# Patient Record
Sex: FEMALE | Employment: UNEMPLOYED | URBAN - METROPOLITAN AREA
[De-identification: names, ages, dates, MRNs, and addresses within clinical notes are randomized per-mention and may not be internally consistent; named-entity substitution may affect disease eponyms.]

---

## 2023-09-07 ENCOUNTER — OFFICE VISIT (OUTPATIENT)
Dept: FAMILY MEDICINE | Facility: CLINIC | Age: 28
End: 2023-09-07
Payer: COMMERCIAL

## 2023-09-07 VITALS
OXYGEN SATURATION: 98 % | HEART RATE: 93 BPM | HEIGHT: 57 IN | BODY MASS INDEX: 21.46 KG/M2 | TEMPERATURE: 97.8 F | WEIGHT: 99.5 LBS | DIASTOLIC BLOOD PRESSURE: 52 MMHG | RESPIRATION RATE: 20 BRPM | SYSTOLIC BLOOD PRESSURE: 90 MMHG

## 2023-09-07 DIAGNOSIS — R06.2 WHEEZING: Primary | ICD-10-CM

## 2023-09-07 DIAGNOSIS — R06.00 DYSPNEA, UNSPECIFIED TYPE: ICD-10-CM

## 2023-09-07 PROCEDURE — 99203 OFFICE O/P NEW LOW 30 MIN: CPT | Performed by: FAMILY MEDICINE

## 2023-09-07 RX ORDER — ALBUTEROL SULFATE 90 UG/1
2 AEROSOL, METERED RESPIRATORY (INHALATION) EVERY 6 HOURS PRN
COMMUNITY
End: 2023-09-07

## 2023-09-07 RX ORDER — ALBUTEROL SULFATE 90 UG/1
2 AEROSOL, METERED RESPIRATORY (INHALATION) EVERY 6 HOURS PRN
Qty: 18 G | Status: CANCELLED | OUTPATIENT
Start: 2023-09-07

## 2023-09-07 ASSESSMENT — PATIENT HEALTH QUESTIONNAIRE - PHQ9
SUM OF ALL RESPONSES TO PHQ QUESTIONS 1-9: 13
10. IF YOU CHECKED OFF ANY PROBLEMS, HOW DIFFICULT HAVE THESE PROBLEMS MADE IT FOR YOU TO DO YOUR WORK, TAKE CARE OF THINGS AT HOME, OR GET ALONG WITH OTHER PEOPLE: VERY DIFFICULT
SUM OF ALL RESPONSES TO PHQ QUESTIONS 1-9: 13

## 2023-09-07 NOTE — PROGRESS NOTES
"  Wheezing  She is using albuterol twice a day, scheduled.  Advised to use only as needed.  We will add Dulera.  Unclear if she has history of asthma.  We will try to get records from [place].    Dyspnea, unspecified type  - mometasone-formoterol (DULERA) 100-5 MCG/ACT inhaler; Inhale 2 puffs into the lungs 2 times daily     Subjective   Pu is a 28 year old female here with a complaint of \" trouble breathing\".  She is new to this clinic.  She moved to [place] from [place] in mid June this year, about 2 months ago.  She was seen at [facility] due to shortness of breath and was prescribed albuterol inhaler.  She has been using albuterol twice a day, scheduled.  Reports numbness of breath once or twice a week with wheezing.  Unclear if she has history of asthma.  States she was never hospitalized due to shortness of breath in the past.        9/7/2023     3:44 PM   Additional Questions   Roomed by chela contreras   Accompanied by Self       Review of Systems   CONSTITUTIONAL: NEGATIVE for fever, chills, change in weight  CV: NEGATIVE for chest pain/chest pressure      Objective    BP 90/52 (BP Location: Left arm, Patient Position: Sitting, Cuff Size: Adult Regular)   Pulse 93   Temp 97.8  F (36.6  C) (Oral)   Resp 20   Ht 1.44 m (4' 8.69\")   Wt 45.1 kg (99 lb 8 oz)   LMP 09/03/2023 (Approximate)   SpO2 98%   BMI 21.77 kg/m    Body mass index is 21.77 kg/m .  Physical Exam   GENERAL: healthy, alert and no distress  NECK: Supple  RESP: lungs clear to auscultation - no rales, rhonchi or wheezes  CV: regular rates and rhythm and no murmur, click or rub  PSYCH: mentation appears normal, affect normal/bright                    "

## 2023-09-26 ENCOUNTER — TELEPHONE (OUTPATIENT)
Dept: FAMILY MEDICINE | Facility: CLINIC | Age: 28
End: 2023-09-26
Payer: COMMERCIAL

## 2023-09-26 NOTE — TELEPHONE ENCOUNTER
Forms/Letter Request    Type of form/letter:  [facility]    Have you been seen for this request: Yes 09/07/2023    Do we have the form/letter: Yes:     Who is the form from? Patient    Where did/will the form come from? Patient or family brought in       When is form/letter needed by: asap    How would you like the form/letter returned:     Patient Notified form requests are processed in 3-5 business days:Yes    Okay to leave a detailed message?: Yes at Cell number on file:    Telephone Information:   Mobile [phone]

## 2023-09-28 NOTE — TELEPHONE ENCOUNTER
Called patient, scheduled an office visit with provider on 10/13/23 to come in and have her form filled out.       Sol Dee, MSN, RN   [facility] [place] [facility]

## 2023-09-28 NOTE — TELEPHONE ENCOUNTER
I saw the patient only once. Unclear if she has chronic medical conditions to prevent her from working.   She should come in in person to discuss this.   I did not sign the form.     Dr. Jose Lewis  9/28/2023 9:58 AM

## 2023-10-13 ENCOUNTER — OFFICE VISIT (OUTPATIENT)
Dept: FAMILY MEDICINE | Facility: CLINIC | Age: 28
End: 2023-10-13
Payer: COMMERCIAL

## 2023-10-13 VITALS
RESPIRATION RATE: 20 BRPM | OXYGEN SATURATION: 100 % | WEIGHT: 103.4 LBS | SYSTOLIC BLOOD PRESSURE: 93 MMHG | DIASTOLIC BLOOD PRESSURE: 60 MMHG | HEIGHT: 57 IN | BODY MASS INDEX: 22.31 KG/M2 | TEMPERATURE: 98.4 F | HEART RATE: 73 BPM

## 2023-10-13 DIAGNOSIS — R06.00 DYSPNEA, UNSPECIFIED TYPE: ICD-10-CM

## 2023-10-13 DIAGNOSIS — Z59.9 FINANCIAL DIFFICULTIES: ICD-10-CM

## 2023-10-13 DIAGNOSIS — R06.2 WHEEZING: Primary | ICD-10-CM

## 2023-10-13 PROCEDURE — 99213 OFFICE O/P EST LOW 20 MIN: CPT | Performed by: FAMILY MEDICINE

## 2023-10-13 SDOH — ECONOMIC STABILITY - INCOME SECURITY: PROBLEM RELATED TO HOUSING AND ECONOMIC CIRCUMSTANCES, UNSPECIFIED: Z59.9

## 2023-10-13 NOTE — PROGRESS NOTES
"  Wheezing  Improved.    Dyspnea, unspecified type  Improved.    Financial difficulties  - Primary Care - Care Coordination Referral; Future     Subjective   Pu is a 28 year old, presenting for the following health issues:  Forms      10/13/2023     7:08 AM   Additional Questions   Roomed by Monica   The patient is here requesting form to be filled out by provider for [facility] stating she is unable to work.  She moved to [place] from [place] 4 months ago.  She was seen here once for wheezing last month.  She was given inhalers.  She states her wheezing has improved.  Shortness of breath has improved.  She is not using her inhalers now.  No other known chronic medical conditions.        Review of Systems   CONSTITUTIONAL: NEGATIVE for fever, chills, change in weight  RESP: NEGATIVE for significant cough or SOB  CV: NEGATIVE for chest pain/chest pressure      Objective    LMP  (LMP Unknown)   Vitals:    10/13/23 0712   BP: 93/60   Pulse: 73   Resp: 20   Temp: 98.4  F (36.9  C)   TempSrc: Oral   SpO2: 100%   Weight: 46.9 kg (103 lb 6.4 oz)   Height: 1.448 m (4' 9\")      Physical Exam   GENERAL: healthy, alert and no distress  NECK: Supple  RESP: lungs clear to auscultation - no rales, rhonchi or wheezes  CV: regular rates and rhythm and no murmur, click or rub  PSYCH: mentation appears normal, affect normal/bright      This transcription uses voice recognition software, which may contain typographical errors.                "

## 2023-10-16 ENCOUNTER — PATIENT OUTREACH (OUTPATIENT)
Dept: CARE COORDINATION | Facility: CLINIC | Age: 28
End: 2023-10-16
Payer: COMMERCIAL

## 2023-10-16 NOTE — PROGRESS NOTES
"Clinic Care Coordination Contact  Community Health Worker Initial Outreach  Spoke with patient through [place]  ID [id number]    CHW Initial Information Gathering:  Referral Source: PCP  Current living arrangement:: I live in a private home with family  Type of residence:: Apartment  Community Resources: Financial/Insurance, County Programs (SNAP)  Supplies Currently Used at Home: None  Equipment Currently Used at Home: none  Informal Support system:: Spouse  No PCP office visit in Past Year: No  Transportation means:: Accessible car, Medical transport  CHW Additional Questions  If ED/Hospital discharge, follow-up appointment scheduled as recommended?: N/A  Medication changes made following ED/Hospital discharge?: N/A  MyChart active?: No  Patient agreeable to assistance with activating MyChart?: No    Chart Review: Referral from PCP  Reason for Referral: Financial Support, Food, housing   Additional Information: Trying to get food stamps    Patient accepts CC: Yes. Patient scheduled for assessment with KURT Hall on 10/19/23 at 2PM. Patient noted desire to discuss CCC, resources for financial support, food, and housing. .     Patient shares, that household is receiving \"food stamp\" currently. However, they are required to work 55 hours. Her  is only working 24 hours. Patient could not state if requirement is per nate Martinez  Clinic Care Coordination  [facility]    Phone: [phone]        "

## 2023-10-19 ENCOUNTER — PATIENT OUTREACH (OUTPATIENT)
Dept: FAMILY MEDICINE | Facility: CLINIC | Age: 28
End: 2023-10-19
Payer: COMMERCIAL

## 2023-10-19 ASSESSMENT — ACTIVITIES OF DAILY LIVING (ADL): DEPENDENT_IADLS:: INDEPENDENT

## 2023-10-19 NOTE — LETTER
Primary care doctor    [facility]  Primary Clinic  Accepting new patients  [address] [place] [address]  1.63 miles [phone]    [facility]  Specialty Clinic  Accepting new patients  [address] [place] [address]  1.98 miles [phone]    [facility]  Specialty Clinic  Accepting new patients  [address]  [place] [address]  2.11 miles [phone]    [facility]  Specialty Clinic  Accepting new patients  [address]  [address] [address]/20/23, 11:19 AM Print about:blank 2/3   [place] [address]  2.11 miles [phone]    [facility]  Specialty Clinic  Accepting new patients  [address]  [place] [address]  2.11 miles [phone]      Employment center       [facility]  Job Search Assistance    [facility]    [address] [place], [place], [address]    Distance: 2 Miles    [phone]      [facility] - Employment Services  Job Search Assistance    [facility]    [address] [place], [place], [address]    Distance: 1 Miles    [phone]    [facility] workforce center     [employer]  [phone] (Fax)  Contact form  Department information    In-person locations -  Walk-in services available    Address In-person services  [place]   [address] [facility]    Mondays and Wednesdays  10 a.m.-4 p.m.  [facility] [place]    [address]  [address] Tuesdays & Thursdays  10 a.m.-4:30 p.m.  [facility] [address] Thursdays  10 a.m.-12:30 p.m.  [place] [facility] [address] Tuesdays  1-3 p.m.  [employer] in [place] [address]   Monday - Thursday  8 a.m.-4:30 p.m.

## 2023-10-19 NOTE — LETTER
[facility] CARE COORDINATION  [address]  [place] [address]    October 20, 2023    Cedric [facility]  [address]   [place] [address]      Dear Cedric,        I am a  clinic care coordinator who works with Jose Lewis MD with the [facility]. I wanted to thank you for spending the time to talk with me.  Below is a description of clinic care coordination and how I can further assist you.       The clinic care coordination team is made up of a registered nurse, , financial resource worker and community health worker who understand the health care system. The goal of clinic care coordination is to help you manage your health and improve access to the health care system. Our team works alongside your provider to assist you in determining your health and social needs. We can help you obtain health care and community resources, providing you with necessary information and education. We can work with you through any barriers and develop a care plan that helps coordinate and strengthen the communication between you and your care team.  Our services are voluntary and are offered without charge to you personally.    Please feel free to contact me with any questions or concerns regarding care coordination and what we can offer.      We are focused on providing you with the highest-quality healthcare experience possible.    Sincerely,     Ayesha Raya, [facility]  Social Work Care Cooridinator   [facility]   Phone: [phone]

## 2023-10-19 NOTE — LETTER
[facility]  Patient Centered Plan of Care  About Me:        Patient Name:  Cedric Baldwin    YOB: 1995  Age:         28 year old   Yamil MRN:    [id number] Telephone Information:  Home Phone [phone]   Mobile [phone]       Address:  [address]  [place] [address] Email address:  [email or web address]      Emergency Contact(s)    Name Relationship Lgl Grd Work Phone Home Phone Mobile Phone   1Quinn AVALOS BERENICE Brother    [phone]           Primary language:  Sabine      needed? Yes   [place] [employer]:  [phone] op. 1  Other communication barriers:Language barrier    Preferred Method of Communication:  Phone   Current living arrangement: I live in a private home with family    Mobility Status/ Medical Equipment: Independent        Health Maintenance  Health Maintenance Reviewed: Up to date      My Access Plan  Medical Emergency 911   Primary Clinic Line [facility] [phone]   24 Hour Appointment Line [phone] or  [phone]) (toll-free)   24 Hour Nurse Line [phone] (toll-free)   Preferred Urgent Care [facility], [phone]     [facility]  [phone]     Preferred Pharmacy [facility] - [place], [place] - [address]     Behavioral Health Crisis Line The National Suicide Prevention Lifeline at 1-592.120.3377 or Text/Call 558             My Care Team Members  Patient Care Team         Relationship Specialty Notifications Start End    Jose Lewis MD PCP - General Family Medicine  10/19/23     Phone: [phone] Fax: [phone]         [address] [place] [address]    Jose Lewis MD Assigned PCP   8/9/23     Phone: [phone] Fax: [phone]         [address] [place] [address]    Rhiannon Martinez Community Health Worker Primary Care - CC Admissions 10/13/23     Machelle Raya MSW Lead Care  Coordinator  Admissions 10/19/23               My Care Plans  Self Management and Treatment Plan  Care Plan  Care Plan: Financial Wellbeing       Problem: Patient expresses financial resource strain       Goal: Create an action plan to increase financial stability       Start Date: 10/19/2023    This Visit's Progress: 10%    Priority: High    Note:     Barriers: Income, employment   Strengths: Spouse motivated to get employment  Patient expressed understanding of goal: Yes  Action steps to achieve this goal:  1. I will assist my spouse in engaging with workforce center in the next month.   2. I will engage with FRW to assist energy and emergency assistance.   3. I will engage with CC monthly and request additional support as needed.                               Care Plan: Sleep       Problem: Impaired Sleep Pattern       Goal: Demonstrate improved sleep habits       Start Date: 10/19/2023 Expected End Date: 1/20/2024    This Visit's Progress: 10%    Priority: Medium    Note:     Barriers: Knowledge   Strengths: Willing to seek assistance   Patient expressed understanding of goal: yes  Action steps to achieve this goal:  1. I will schedule an appointment with PCP and discuss medications assistance in next month.   2. I will engage with CC monthly and requested additional support as needed.                                 Care Plan: Mental Health       Problem: Mental Health Symptoms Need Improvement       Goal: Improve management of mental health symptoms and establish with mental health/psychosocial supports       Start Date: 10/19/2023 Expected End Date: 1/20/2024    This Visit's Progress: 10%    Note:     Barriers: knowledge   Strengths: Willing to seek assistance  Patient expressed understanding of goal: Yes  Action steps to achieve this goal:  1. I will schedule an appointment with pcp to discuss medications management for anxiety.  2. I will engage with CC monthly and request support as needed.                                Care Plan: Establish PCP       Problem: No Established Primary Care Provider       Goal: Establish with Primary Care Provider and complete an initial visit       Start Date: 10/19/2023    This Visit's Progress: 10%    Priority: High    Note:     Barriers: Knowledge of pcp clinic close to my address.   Strengths: Motivated to establish care  Patient expressed understanding of goal: Yes  Action steps to achieve this goal:  1. I will review list from mail by [facility] and establish care with a provider from the list in next month.   2. I will engage with CC monthly and requested additional support as needed.                                    Action Plans on File:                       Advance Care Plans/Directives Type:   No data recorded    My Medical and Care Information  Problem List   There is no problem list on file for this patient.     Current Medications and Allergies:  See printed Medication Report.    Care Coordination Start Date: 10/13/2023   Frequency of Care Coordination: monthly     Form Last Updated: 10/20/2023

## 2023-10-20 NOTE — PROGRESS NOTES
Clinic Care Coordination Contact  Clinic Care Coordination Contact  OUTREACH    Referral Information:  Referral Source: PCP    Primary Diagnosis: Psychosocial    Pu is 28 year old woman who resides in apartment with her  and two children. Pu shared [facility] as informed that she will close her SNAP unless her provider fills out paperwork of medical waiver for employment. Pu shared [facility] is request for her and her  to work 55 hours per week in order to continue to receive SNAP and cash assistance. Pu noted her  is actively looking for work and has had few interviews however he has been offered a job yet. Writer informed if they have notified of county reason unemployed and encouraged for Pu and her spouse to connect with workforce center.      Pu noted she doesn't want to work and [facility] is requesting a medical waiver. Writer inquired reason why Pu unable to work. Pu noted she used to work past however at time she doesn't want to work. She wants her  to work now. Writer provided education around medical waiver needing specific medical diagnoses. Writer informed Pu Dr Lewis will not be able to sign a medical waiver since there is no specific diagnoses preventing her from working. Pu noted she understands and the only reason she brought paperwork is at [facility]'s request. Writer reinforced medical waivers needing specific medical diagnoses and that she since she doesn't have one, Dr Lewis will not be able to sign the waiver. Writer encouraged for Pu to let [facility] worker know that there is no specific medical diagnoses preventing from working and see what else [facility] request from them.     Pu shared anxiety and sleep disturbance. She reports symptoms of SOB that appear to related to anxiety. Writer discuss tips and strategies of managing anxiety attacks. Writer offered therapy however Pu is not interested talk therapy. Pu noted she only interested in medications management at this time. Writer  encouraged for Pu to discuss medication support with pcp during her next visit. Pu noted [facility] clinic too far for her so she would like to establish care with pcp that is closer. Writer provided [facility] clinic closer to her address and also send her list.     Pu shared struggles with paying energy bill and since they both unemployed at the moment, she not sure they will able to rent next month. Writer send referral for FRW to assist with energy assistance and emergency assistance.     No chief complaint on file.       Universal Utilization: appropriate   Clinic Utilization  Difficulty keeping appointments:: No  Compliance Concerns: No  No-Show Concerns: No  No PCP office visit in Past Year: No  Utilization      No Show Count (past year)  0             ED Visits  0             Hospital Admissions  0                    Current as of: 10/20/2023 12:50 AM                Clinical Concerns:  Current Medical Concerns:    Current Outpatient Medications   Medication    mometasone-formoterol (DULERA) 100-5 MCG/ACT inhaler     No current facility-administered medications for this visit.         Current Behavioral Concerns: Pu reported anxiety however declined therapy at this time. She is open to medications management.     Education Provided to patient: Provided education around medical waiver and [facility] resources.    Pain  Pain (GOAL):: No  Health Maintenance Reviewed: Up to date  Clinical Pathway: None    Medication Management:  Medication review status: Medications reviewed and no changes reported per patient.             Functional Status:  Dependent ADLs:: Independent  Dependent IADLs:: Independent  Mobility Status: Independent  Fallen 2 or more times in the past year?: No  Any fall with injury in the past year?: No    Living Situation:  Current living arrangement:: I live in a private home with family  Type of residence:: Apartment    Lifestyle & Psychosocial Needs:    Social Determinants of Health     Food  Insecurity: High Risk (10/13/2023)    Food Insecurity     Within the past 12 months, did you worry that your food would run out before you got money to buy more?: Yes     Within the past 12 months, did the food you bought just not last and you didn t have money to get more?: Yes   Depression: At risk (9/7/2023)    PHQ-2     PHQ-2 Score: 3   Housing Stability: High Risk (10/13/2023)    Housing Stability     Do you have housing? : Yes     Are you worried about losing your housing?: Yes   Tobacco Use: Low Risk  (10/13/2023)    Patient History     Smoking Tobacco Use: Never     Smokeless Tobacco Use: Never     Passive Exposure: Never   Financial Resource Strain: High Risk (10/13/2023)    Financial Resource Strain     Within the past 12 months, have you or your family members you live with been unable to get utilities (heat, electricity) when it was really needed?: Yes   Alcohol Use: Not on file   Transportation Needs: High Risk (10/13/2023)    Transportation Needs     Within the past 12 months, has lack of transportation kept you from medical appointments, getting your medicines, non-medical meetings or appointments, work, or from getting things that you need?: Yes   Physical Activity: Not on file   Interpersonal Safety: Low Risk  (10/13/2023)    Interpersonal Safety     Do you feel physically and emotionally safe where you currently live?: Yes     Within the past 12 months, have you been hit, slapped, kicked or otherwise physically hurt by someone?: No     Within the past 12 months, have you been humiliated or emotionally abused in other ways by your partner or ex-partner?: No   Stress: Not on file   Social Connections: Not on file     Diet:: Regular  Inadequate nutrition (GOAL):: No  Tube Feeding: No  Inadequate activity/exercise (GOAL):: No  Significant changes in sleep pattern (GOAL): Yes  Transportation means:: Accessible car, Medical transport, Regular car     Temple or spiritual beliefs that impact treatment::  No  Mental health DX:: No  Mental health management concern (GOAL):: No  Chemical Dependency Status: No Current Concerns  Informal Support system:: Spouse      Resources and Interventions:  Current Resources:      Community Resources: Financial/Insurance, County Programs (SNAP)  Supplies Currently Used at Home: None  Equipment Currently Used at Home: none  Employment Status: unemployed         Advance Care Plan/Directive  Advanced Care Plans/Directives on file:: No    Referrals Placed: FRW     Care Plan:  Care Plan: Financial Wellbeing       Problem: Patient expresses financial resource strain       Goal: Create an action plan to increase financial stability       Start Date: 10/19/2023    This Visit's Progress: 10%    Priority: High    Note:     Barriers: Income, employment   Strengths: Spouse motivated to get employment  Patient expressed understanding of goal: Yes  Action steps to achieve this goal:  1. I will assist my spouse in engaging with workforce center in the next month.   2. I will engage with FRW to assist energy and emergency assistance.   3. I will engage with CC monthly and request additional support as needed.                               Care Plan: Sleep       Problem: Impaired Sleep Pattern       Goal: Demonstrate improved sleep habits       Start Date: 10/19/2023 Expected End Date: 1/20/2024    This Visit's Progress: 10%    Priority: Medium    Note:     Barriers: Knowledge   Strengths: Willing to seek assistance   Patient expressed understanding of goal: yes  Action steps to achieve this goal:  1. I will schedule an appointment with PCP and discuss medications assistance in next month.   2. I will engage with CC monthly and requested additional support as needed.                                 Care Plan: Mental Health       Problem: Mental Health Symptoms Need Improvement       Goal: Improve management of mental health symptoms and establish with mental health/psychosocial supports       Start  Date: 10/19/2023 Expected End Date: 1/20/2024    This Visit's Progress: 10%    Note:     Barriers: knowledge   Strengths: Willing to seek assistance  Patient expressed understanding of goal: Yes  Action steps to achieve this goal:  1. I will schedule an appointment with pcp to discuss medications management for anxiety.  2. I will engage with CC monthly and request support as needed.                               Care Plan: Establish PCP       Problem: No Established Primary Care Provider       Goal: Establish with Primary Care Provider and complete an initial visit       Start Date: 10/19/2023    This Visit's Progress: 10%    Priority: High    Note:     Barriers: Knowledge of pcp clinic close to my address.   Strengths: Motivated to establish care  Patient expressed understanding of goal: Yes  Action steps to achieve this goal:  1. I will review list from mail by [facility] and establish care with a provider from the list in next month.   2. I will engage with CC monthly and requested additional support as needed.                                   Patient/Caregiver understanding: Pt reports understanding and denies any additional questions or concerns at this times.  CC engaged in AIDET communication during encounter.      Outreach Frequency: monthly  Future Appointments                In 1 month Jose Lewis MD [facility] RENAN Vanegas SPRO            Plan: Pu will engage with FRW and establish care clinic pcp clinic that closer to her address.      Ayesha Raya [facility]  Social Work Care [place]   [facility]   Phone: [phone]

## 2023-10-23 ENCOUNTER — PATIENT OUTREACH (OUTPATIENT)
Dept: CARE COORDINATION | Facility: CLINIC | Age: 28
End: 2023-10-23
Payer: COMMERCIAL

## 2023-10-23 NOTE — PROGRESS NOTES
Clinic Care Coordination Contact  Program:  County:  County Case #:  County Worker:   Riaz #:   Subscriber #:   Renewal:  Date Applied:     FRW Outreach:   10/23/23 FRW unable to find a [employer]  at this time and will try back in 2 days.   Kely Woods   Financial Resource Worker  [facility]  Clinic Care Coordination  [phone]     Health Insurance:      Referral/Screening:  FRW Screening    Row Name 10/19/23 4874       [facility] Benefits   Is patient requesting help applying for [facility] benefits? Yes       Have you recently applied for any [facility] benefits? Yes       What was applied for? SNAP;CASH       Do you buy/eat food together? Yes       What is the monthly gross income for the household (wages, social security, workers comp, and pension)? 0       [place]:   [place]       Is this a new insurance application request? No       OTHER   Any other information for the FRW? Needs assistance with Emergency assistance and energy assistance

## 2023-10-26 ENCOUNTER — PATIENT OUTREACH (OUTPATIENT)
Dept: CARE COORDINATION | Facility: CLINIC | Age: 28
End: 2023-10-26
Payer: COMMERCIAL

## 2023-10-26 NOTE — PROGRESS NOTES
Clinic Care Coordination Contact  Program: Emergency assistance and energy assistance  County:[facility] Case #:  [facility] Worker:   Riaz #:   Subscriber #:   Renewal:  Date Applied:     SAURABH Outreach:   10/26/23 FRW called and talked with patient over the phone and she stated that she just got approved for emergency assistance and received the benefits last month due to this the patient would not qualify for emergency assistance at this time.  FRW did help patient with the energy assistance application and mailed it out today. FRW will follow up in 30 days  Kely Woods   Financial Resource Worker  [facility]  Clinic Care Coordination  [phone]    SNAP/CASH Application Screenin. Have you had [facility] benefits before? Yes   2. How many people in the household, do you eat/buy food together?4   3. What is your monthly income (include all tax members)?   4. Do you have a bank account?   5. Do you have any utility bills (electricity, rent, mortgage, phone, insurance, medical bills, etc.)?  Behind on rent 2 months   6. Do you have social security cards and/or green cards?     10/23/23 FRW unable to find a [employer]  at this time and will try back in 2 days.   Kely Woods   Financial Resource Worker  [facility]  Clinic Care Coordination  [phone]     Health Insurance:      Referral/Screening:  FRW Screening    Row Name 10/19/23 1513       [facility] Benefits   Is patient requesting help applying for [facility] benefits? Yes       Have you recently applied for any [facility] benefits? Yes       What was applied for? SNAP;CASH       Do you buy/eat food together? Yes       What is the monthly gross income for the household (wages, social security, workers comp, and pension)? 0       Ins[place]:   [place]       Is this a new insurance application request? No       OTHER   Any other information for the FRW? Needs assistance with Emergency  assistance and energy assistance

## 2023-11-15 ENCOUNTER — PATIENT OUTREACH (OUTPATIENT)
Dept: CARE COORDINATION | Facility: CLINIC | Age: 28
End: 2023-11-15
Payer: COMMERCIAL

## 2023-11-15 ASSESSMENT — ACTIVITIES OF DAILY LIVING (ADL): DEPENDENT_IADLS:: INDEPENDENT

## 2023-11-15 NOTE — PROGRESS NOTES
Clinic Care Coordination Contact  [facility]/Voicemail    Clinical Data: Care Coordinator Outreach    Outreach Documentation Number of Outreach Attempt   11/15/2023  11:48 AM 1     Per [employer] [phone], [place]  not available at this time.     Plan: Care Coordinator will try to reach patient again in 3-5 business days.    Rhiannon Martinez  Clinic Care Coordination  [facility]    Phone: [phone]

## 2023-11-16 ASSESSMENT — ACTIVITIES OF DAILY LIVING (ADL): DEPENDENT_IADLS:: INDEPENDENT

## 2023-11-16 NOTE — PROGRESS NOTES
Clinic Care Coordination Contact  Community Health Worker Follow Up  Spoke with patient through [facility]  ID [id number]    Care Gaps:     Health Maintenance Due   Topic Date Due    YEARLY PREVENTIVE VISIT  Never done    ADVANCE CARE PLANNING  Never done    HEPATITIS B IMMUNIZATION (1 of 3 - 3-dose series) Never done    COVID-19 Vaccine (1) Never done    HIV SCREENING  Never done    HEPATITIS C SCREENING  Never done    PAP  Never done    DTAP/TDAP/TD IMMUNIZATION (1 - Tdap) Never done    INFLUENZA VACCINE (1) Never done     Scheduled 12/7/23 at 10:40AM with Dr. Lewis for preventive care visit.      Care Plan:   Care Plan: Financial Wellbeing       Problem: Patient expresses financial resource strain       Goal: Create an action plan to increase financial stability       Start Date: 10/19/2023    This Visit's Progress: 30% Recent Progress: 10%    Priority: High    Note:     Barriers: Income, employment   Strengths: Spouse motivated to get employment  Patient expressed understanding of goal: Yes  Action steps to achieve this goal:  1. I will assist my spouse in engaging with workforce center in the next month.   2. I will engage with FRW to assist energy and emergency assistance.   3. I will engage with CC monthly and request additional support as needed.                               Care Plan: Sleep       Problem: Impaired Sleep Pattern       Goal: Demonstrate improved sleep habits       Start Date: 10/19/2023 Expected End Date: 1/20/2024    This Visit's Progress: 20% Recent Progress: 10%    Priority: Medium    Note:     Barriers: Knowledge   Strengths: Willing to seek assistance   Patient expressed understanding of goal: yes  Action steps to achieve this goal:  1. I will schedule an appointment with PCP and discuss medications assistance in next month.   2. I will discuss sleep concerns at my next visit on 12/7/23 at 10:40AM with Dr. Lewis.  3. I will engage with CC monthly and requested additional support as  needed.                                 Care Plan: Mental Health       Problem: Mental Health Symptoms Need Improvement       Goal: Improve management of mental health symptoms and establish with mental health/psychosocial supports       Start Date: 10/19/2023 Expected End Date: 1/20/2024    This Visit's Progress: 20% Recent Progress: 10%    Priority: Medium    Note:     Barriers: knowledge   Strengths: Willing to seek assistance  Patient expressed understanding of goal: Yes  Action steps to achieve this goal:  1. I will schedule an appointment with pcp to discuss medications management for anxiety.  2. I will discuss sleep concerns at my next visit on 12/7/23 at 10:40AM with Dr. Lewis.  3. I will engage with CC monthly and request support as needed.                               Care Plan: Establish PCP       Problem: No Established Primary Care Provider       Goal: Establish with Primary Care Provider and complete an initial visit       Start Date: 10/19/2023    This Visit's Progress: 20% Recent Progress: 10%    Priority: High    Note:     Barriers: Knowledge of pcp clinic close to my address.   Strengths: Motivated to establish care  Patient expressed understanding of goal: Yes  Action steps to achieve this goal:  1. I will review list from mail by [facility] and establish care with a provider from the list in next month.   2. I will choose a PCP after my appointment on 12/7/23 at 10:40AM with Dr. Lewis for preventive care visit.  3. I will engage with CC monthly and requested additional support as needed.                               Intervention and Education during outreach:   Patient shares that she's taking online school. Her  has a part-time job currently, working once a week or more sometime and is still in process of applying to other jobs. They will continue to work with [facility] on Energy Assistance application process.   Patient shares that she was approved for SNAP today.     Patient has not decided on a PCP  yet. Patient plans to discuss sleep and anxiety issues at next visit on 12/7 with Dr. Lewis.    Patient plans to choose a PCP after her appointment on 12/7/23 at 10:40AM with Dr. Lewis for preventive care visit.    CHW Plan:  CHW to follow up in 1 month    [facility]  [facility]    Phone: [phone]

## 2023-11-22 ENCOUNTER — PATIENT OUTREACH (OUTPATIENT)
Dept: CARE COORDINATION | Facility: CLINIC | Age: 28
End: 2023-11-22
Payer: COMMERCIAL

## 2023-11-22 NOTE — PROGRESS NOTES
Clinic Care Coordination Contact  Program: Emergency assistance and energy assistance  County:[facility] Case #:  [facility] Worker:   Riaz #:   Subscriber #:   Renewal:  Date Applied:     SAUARBH Outreach:   23  FRW was unable to find an  at this time and moved outreach  Kely Woods   Financial Resource Worker  [facility] Care Coordination  [phone]   10/26/23 FRW called and talked with patient over the phone and she stated that she just got approved for emergency assistance and received the benefits last month due to this the patient would not qualify for emergency assistance at this time.  FRW did help patient with the energy assistance application and mailed it out today. FRW will follow up in 30 days  Kely Woods   Financial Resource Worker  [facility] Care Coordination  [phone]    SNAP/CASH Application Screenin. Have you had [facility] benefits before? Yes   2. How many people in the household, do you eat/buy food together?4   3. What is your monthly income (include all tax members)?   4. Do you have a bank account?   5. Do you have any utility bills (electricity, rent, mortgage, phone, insurance, medical bills, etc.)?  Behind on rent 2 months   6. Do you have social security cards and/or green cards?     10/23/23 FRW [facility]  at this time and will try back in 2 days.   Kely Woods   Financial Resource Worker  [facility] Care Coordination  [phone]     Health Insurance:      Referral/Screening:  SAURABH Screening    Row Name 10/19/23 1513       County Benefits   Is patient requesting help applying for [facility] benefits? Yes       Have you recently applied for any [facility] benefits? Yes       What was applied for? SNAP;CASH       Do you buy/eat food together? Yes       What is the monthly gross income for the household (wages, social security, workers comp, and pension)? 0        [place]:   [place] Yes       Is this a new insurance application request? No       OTHER   Any other information for the FRW? Needs assistance with Emergency assistance and energy assistance

## 2023-11-28 ENCOUNTER — PATIENT OUTREACH (OUTPATIENT)
Dept: CARE COORDINATION | Facility: CLINIC | Age: 28
End: 2023-11-28
Payer: COMMERCIAL

## 2023-11-28 NOTE — PROGRESS NOTES
Clinic Care Coordination Contact  Program: Emergency assistance and energy assistance  County:[facility] Case #:  [facility] Worker:   Riaz #:   Subscriber #:   Renewal:  Date Applied:     SAURABH Outreach:   23  FRW called patient and left a vm with call back information. FRW will make outreach in one week.  Kely Woods   Financial Resource Worker  [facility] Care Coordination  [phone]   23  FRW was unable to find an  at this time and moved outreach  Kely Woods   Financial Resource Worker  [facility] Care Coordination  [phone]   10/26/23 FRW called and talked with patient over the phone and she stated that she just got approved for emergency assistance and received the benefits last month due to this the patient would not qualify for emergency assistance at this time.  FRW did help patient with the energy assistance application and mailed it out today. FRW will follow up in 30 days  Kely Woods   Financial Resource Worker  [facility] Care Coordination  [phone]    SNAP/CASH Application Screenin. Have you had [facility] benefits before? Yes   2. How many people in the household, do you eat/buy food together?4   3. What is your monthly income (include all tax members)?   4. Do you have a bank account?   5. Do you have any utility bills (electricity, rent, mortgage, phone, insurance, medical bills, etc.)?  Behind on rent 2 months   6. Do you have social security cards and/or green cards?     10/23/23 FRW unable to find a [facility]  at this time and will try back in 2 days.   Kely Woods   Financial Resource Worker  [facility] Care Coordination  [phone]     Health Insurance:      Referral/Screening:  FRW Screening    Row Name 10/19/23 1513       County Benefits   Is patient requesting help applying for [facility] benefits? Yes       Have you recently applied for any  [facility] benefits? Yes       What was applied for? SNAP;CASH       Do you buy/eat food together? Yes       What is the monthly gross income for the household (wages, social security, workers comp, and pension)? 0       Ins[place]:   [place]? [place]       Is this a new insurance application request? No       OTHER   Any other information for the FRW? Needs assistance with Emergency assistance and energy assistance

## 2023-12-01 ENCOUNTER — PATIENT OUTREACH (OUTPATIENT)
Dept: CARE COORDINATION | Facility: CLINIC | Age: 28
End: 2023-12-01
Payer: COMMERCIAL

## 2023-12-01 NOTE — PROGRESS NOTES
Care Coordination Clinician Chart Review    Situation: Patient chart reviewed by Care Coordinator.       Background: Care Coordination Program started: 10/13/2023. Initial assessment completed and patient-centered care plan(s) were developed with participation from patient. Lead CC handed patient off to CHW for continued outreaches.       Assessment: Per chart review, patient outreach completed by CC CHW on 11/15/2023.  Patient is actively working to accomplish goal(s). Patient's goal(s) appropriate and relevant at this time. Patient is not due for updated Plan of Care.  Assessments will be completed annually or as needed/with change of patient status.      Care Plan: Financial Wellbeing       Problem: Patient expresses financial resource strain       Goal: Create an action plan to increase financial stability       Start Date: 10/19/2023    This Visit's Progress: 30% Recent Progress: 10%    Priority: High    Note:     Barriers: Income, employment   Strengths: Spouse motivated to get employment  Patient expressed understanding of goal: Yes  Action steps to achieve this goal:  1. I will assist my spouse in engaging with workforce center in the next month.   2. I will engage with FRW to assist energy and emergency assistance.   3. I will engage with CC monthly and request additional support as needed.                               Care Plan: Sleep       Problem: Impaired Sleep Pattern       Goal: Demonstrate improved sleep habits       Start Date: 10/19/2023 Expected End Date: 1/20/2024    This Visit's Progress: 20% Recent Progress: 10%    Priority: Medium    Note:     Barriers: Knowledge   Strengths: Willing to seek assistance   Patient expressed understanding of goal: yes  Action steps to achieve this goal:  1. I will schedule an appointment with PCP and discuss medications assistance in next month.   2. I will discuss sleep concerns at my next visit on 12/7/23 at 10:40AM with Dr. Lewis.  3. I will engage with CC  monthly and requested additional support as needed.                                 Care Plan: Mental Health       Problem: Mental Health Symptoms Need Improvement       Goal: Improve management of mental health symptoms and establish with mental health/psychosocial supports       Start Date: 10/19/2023 Expected End Date: 1/20/2024    This Visit's Progress: 20% Recent Progress: 10%    Priority: Medium    Note:     Barriers: knowledge   Strengths: Willing to seek assistance  Patient expressed understanding of goal: Yes  Action steps to achieve this goal:  1. I will schedule an appointment with pcp to discuss medications management for anxiety.  2. I will discuss sleep concerns at my next visit on 12/7/23 at 10:40AM with Dr. Lewis.  3. I will engage with CC monthly and request support as needed.                               Care Plan: Establish PCP       Problem: No Established Primary Care Provider       Goal: Establish with Primary Care Provider and complete an initial visit       Start Date: 10/19/2023    This Visit's Progress: 20% Recent Progress: 10%    Priority: High    Note:     Barriers: Knowledge of pcp clinic close to my address.   Strengths: Motivated to establish care  Patient expressed understanding of goal: Yes  Action steps to achieve this goal:  1. I will review list from mail by [facility] and establish care with a provider from the list in next month.   2. I will choose a PCP after my appointment on 12/7/23 at 10:40AM with Dr. Lewis for preventive care visit.  3. I will engage with CC monthly and requested additional support as needed.                                      Plan/Recommendations: The patient will continue working with Care Coordination to achieve goal(s) as above. CHW will continue outreaches at minimum every 30 days and will involve Lead CC as needed or if patient is ready to move to Maintenance. Lead CC will continue to monitor CHW outreaches and patient's progress to goal(s) every 6 weeks.      Plan of Care updated and sent to patient: Lyla Raya [facility]  Social Work Care [place]helle   [facility]   Phone: [phone]

## 2023-12-07 ENCOUNTER — PATIENT OUTREACH (OUTPATIENT)
Dept: CARE COORDINATION | Facility: CLINIC | Age: 28
End: 2023-12-07

## 2023-12-07 ENCOUNTER — OFFICE VISIT (OUTPATIENT)
Dept: FAMILY MEDICINE | Facility: CLINIC | Age: 28
End: 2023-12-07
Payer: COMMERCIAL

## 2023-12-07 VITALS
SYSTOLIC BLOOD PRESSURE: 94 MMHG | HEIGHT: 57 IN | OXYGEN SATURATION: 97 % | BODY MASS INDEX: 22.5 KG/M2 | DIASTOLIC BLOOD PRESSURE: 63 MMHG | RESPIRATION RATE: 20 BRPM | TEMPERATURE: 98.9 F | WEIGHT: 104.3 LBS | HEART RATE: 89 BPM

## 2023-12-07 DIAGNOSIS — R06.2 WHEEZING: ICD-10-CM

## 2023-12-07 DIAGNOSIS — Z12.4 CERVICAL CANCER SCREENING: ICD-10-CM

## 2023-12-07 DIAGNOSIS — Z00.00 ANNUAL PHYSICAL EXAM: Primary | ICD-10-CM

## 2023-12-07 PROCEDURE — 99395 PREV VISIT EST AGE 18-39: CPT | Performed by: FAMILY MEDICINE

## 2023-12-07 RX ORDER — MONTELUKAST SODIUM 10 MG/1
10 TABLET ORAL AT BEDTIME
Qty: 90 TABLET | Refills: 1 | Status: SHIPPED | OUTPATIENT
Start: 2023-12-07 | End: 2024-03-14

## 2023-12-07 RX ORDER — ALBUTEROL SULFATE 90 UG/1
2 AEROSOL, METERED RESPIRATORY (INHALATION) EVERY 6 HOURS PRN
Qty: 18 G | Refills: 4 | Status: SHIPPED | OUTPATIENT
Start: 2023-12-07 | End: 2024-03-14

## 2023-12-07 ASSESSMENT — ENCOUNTER SYMPTOMS
DIZZINESS: 0
HEMATURIA: 0
SORE THROAT: 0
CONSTIPATION: 0
NAUSEA: 0
PARESTHESIAS: 0
EYE PAIN: 0
CHILLS: 0
ABDOMINAL PAIN: 0
FREQUENCY: 0
HEARTBURN: 0
DIARRHEA: 0
ARTHRALGIAS: 0
JOINT SWELLING: 0
MYALGIAS: 0
SHORTNESS OF BREATH: 0
HEADACHES: 0
HEMATOCHEZIA: 0
NERVOUS/ANXIOUS: 0
DYSURIA: 0
COUGH: 0
FEVER: 0
BREAST MASS: 0
WEAKNESS: 0
PALPITATIONS: 0

## 2023-12-07 NOTE — PROGRESS NOTES
SUBJECTIVE:   Cedric is a 28 year old, presenting for the following:  Physical  Using albuterol as needed for SOB/wheezing. No acute wheezing today.   Rxed Dulera, reports throat itchiness , stopped using it.   Does not want pap. Does not want shots.           2023     9:55 AM   Additional Questions   Roomed by Kathrin ROLON       Healthy Habits:     Getting at least 3 servings of Calcium per day:  Yes    Bi-annual eye exam:  NO    Dental care twice a year:  NO    Sleep apnea or symptoms of sleep apnea:  None    Diet:  Regular (no restrictions)    Frequency of exercise:  None    Taking medications regularly:  No    Barriers to taking medications:  Side effects    Medication side effects:  None    Additional concerns today:  No        Have you ever done Advance Care Planning? (For example, a Health Directive, POLST, or a discussion with a medical provider or your loved ones about your wishes): No, advance care planning information given to patient to review.  Patient plans to discuss their wishes with loved ones or provider.      Social History     Tobacco Use    Smoking status: Never     Passive exposure: Never    Smokeless tobacco: Never   Substance Use Topics    Alcohol use: Not on file             2023    10:05 AM   Alcohol Use   Prescreen: >3 drinks/day or >7 drinks/week? No     Reviewed orders with patient.  Reviewed health maintenance and updated orders accordingly - Yes      Breast Cancer Screenin/7/2023    10:09 AM   Breast CA Risk Assessment (FHS-7)   Do you have a family history of breast, colon, or ovarian cancer? No / Unknown         Patient under 40 years of age: Routine Mammogram Screening not recommended.   Pertinent mammograms are reviewed under the imaging tab.    History of abnormal Pap smear: NO - age 21-29 PAP every 3 years recommended     Reviewed and updated as needed this visit by clinical staff   Tobacco  Allergies  Meds              Reviewed and updated as needed this  "visit by Provider                     Review of Systems   Constitutional:  Negative for chills and fever.   HENT:  Negative for congestion, ear pain, hearing loss and sore throat.    Eyes:  Negative for pain and visual disturbance.   Respiratory:  Negative for cough and shortness of breath.    Cardiovascular:  Negative for chest pain, palpitations and peripheral edema.   Gastrointestinal:  Negative for abdominal pain, constipation, diarrhea, heartburn, hematochezia and nausea.   Breasts:  Negative for tenderness, breast mass and discharge.   Genitourinary:  Negative for dysuria, frequency, genital sores, hematuria, pelvic pain, urgency, vaginal bleeding and vaginal discharge.   Musculoskeletal:  Negative for arthralgias, joint swelling and myalgias.   Skin:  Negative for rash.   Neurological:  Negative for dizziness, weakness, headaches and paresthesias.   Psychiatric/Behavioral:  Negative for mood changes. The patient is not nervous/anxious.         OBJECTIVE:   BP 94/63   Pulse 89   Temp 98.9  F (37.2  C) (Oral)   Resp 20   Ht 1.448 m (4' 9\")   Wt 47.3 kg (104 lb 4.8 oz)   LMP 11/27/2023 (Exact Date)   SpO2 97%   BMI 22.57 kg/m    Physical Exam    Gen - alert, orientated, NAD  Eyes - fundascopic exam limited by the undialated pupil but looks symmetric  ENT - oropharynx clear, TMs clear  Neck - supple, no palpable mass or lymphadenopathy  CV - RRR, no murmur  Breast - Declined  Resp - lungs CTA  Ab - soft, nontender, no palpable mass or organomegaly   - Declined  Extrem - warm, no edema  Neuro - CN II-XII intact, strength, sensation, reflexes intact and symmetric  Skin - no rash.  No atypical appearing lesions seen.          ASSESSMENT/PLAN:   Annual physical exam      Wheezing  Wants pill, does not lie Dulera  - albuterol (PROAIR HFA/PROVENTIL HFA/VENTOLIN HFA) 108 (90 Base) MCG/ACT inhaler; Inhale 2 puffs into the lungs every 6 hours as needed for shortness of breath, wheezing or cough  - montelukast " (SINGULAIR) 10 MG tablet; Take 1 tablet (10 mg) by mouth at bedtime    Cervical cancer screening  Declined pap        COUNSELING:  Reviewed preventive health counseling, as reflected in patient instructions       Regular exercise       Healthy diet/nutrition        She reports that she has never smoked. She has never been exposed to tobacco smoke. She has never used smokeless tobacco.        Jose Lewis MD  [facility]

## 2023-12-07 NOTE — PROGRESS NOTES
Clinic Care Coordination Contact  Program: Emergency assistance and energy assistance  County:[facility] Case #:  [facility] Worker:   Riaz #:   Subscriber #:   Renewal:  Date Applied:     FRW Outreach:   23 FRW called and patient did not get the energy assistance application in the mail so FRW mailed it out again and will follow up in 30 days   Kely Woods   Financial Resource Worker  [facility] Care Coordination  [phone]    23  FRW called patient and left a vm with call back information. FRW will make outreach in one week.  Kely Woods   Financial Resource Worker  [facility] Care Coordination  [phone]   23  FRW was unable to find an  at this time and moved outreach  Kely Woods   Financial Resource Worker  [facility] Care Coordination  [phone]   10/26/23 FRW called and talked with patient over the phone and she stated that she just got approved for emergency assistance and received the benefits last month due to this the patient would not qualify for emergency assistance at this time.  FRW did help patient with the energy assistance application and mailed it out today. FRW will follow up in 30 days  Kely Woods   Financial Resource Worker  [facility] Care Coordination  [phone]    SNAP/CASH Application Screenin. Have you had county benefits before? Yes   2. How many people in the household, do you eat/buy food together?4   3. What is your monthly income (include all tax members)?   4. Do you have a bank account?   5. Do you have any utility bills (electricity, rent, mortgage, phone, insurance, medical bills, etc.)?  Behind on rent 2 months   6. Do you have social security cards and/or green cards?     10/23/23 FRW unable to find a [employer]  at this time and will try back in 2 days.   Kely Woods   Financial Resource Worker  [facility]  clinics  Clinic Care Coordination  [phone]     Health Insurance:      Referral/Screening:  FRW Screening    Row Name 10/19/23 3918       County Benefits   Is patient requesting help applying for county benefits? Yes       Have you recently applied for any county benefits? Yes       What was applied for? SNAP;CASH       Do you buy/eat food together? Yes       What is the monthly gross income for the household (wages, social security, workers comp, and pension)? 0       [place]:   [place]? [place]       Is this a new insurance application request? No       OTHER   Any other information for the FRW? Needs assistance with Emergency assistance and energy assistance

## 2023-12-07 NOTE — COMMUNITY RESOURCES LIST (ENGLISH)
12/07/2023   [facility]  N/A  For questions about this resource list or additional care needs, please contact your primary care clinic or care manager.  Phone: [phone]   Email: N/A   Address: [address] [place] [address]   Hours: N/A        Financial Stability       Utility payment assistance  1  [place] [employer] [facility] - Energy and Utilities Distance: 0.98 miles      In-Person, Phone/Virtual   [address] [place] [address]  Language: English  Hours: Mon - Fri 8:30 AM - 4:30 PM  Fees: Free   Phone: (904) 762-1488 Website: https://[place].[facility]/[employer]rb[employer]/energy/consumer-assistance/energy-assistance-program/     2  [facility] Distance: 1.02 miles      In-Person, Phone/Virtual   [address] [place] [address]  Language: English  Hours: Mon - Fri 8:00 AM - 4:30 PM  Fees: Free   Phone: (702) 932-4095 Email: franc@[facility]. Website: https://www.[facility]./your-[facility]/departments/health-and-wellness          Food and Nutrition       Food pantry  3  [facility] Distance: 0.97 miles      [facility]   [address] [place] [address]  Language: English, Angolan  Hours: Mon 9:00 AM - 6:00 PM Appt. Only, Tue - Fri 9:00 AM - 5:00 PM Appt. Only  Fees: Free   Phone: (550) 410-4653 Email: info@[employer].org Website: http://www.[employer].org/site     4  \[facility] [place] [facility]\"" Service [place] Distance: 1.33 miles      [facility]   [address] [place] [address]  Language: English, Hmong, Moshe, Angolan  Hours: Mon 11:00 AM - 3:00 PM , Wed 11:00 AM - 3:00 PM , Fri 11:00 AM - 3:00 PM  Fees: Free, Self Pay   Phone: (758) 863-3412 Email: Michelle@[facility].org Website: http://[facility].org/community/us-kebq-x-[facility]/     SNAP application assistance  5  [employer] [place] Distance: 0.2 miles      Phone/Virtual   [address] [place] [address]  [place] [address]  Language: English, Hmong, Sao Tomean, Spanish, Bruneian  Hours: Mon - Fri 8:30 AM - 4:30 PM  Fees: Free   Phone: (221) 822-8911 Email: helpline@[employer].org Website: https://www.[employer].org/programs/[place]-food-helpline/     6  [place] [employer] (SNAP) Distance: 0.96 miles      Phone/Virtual   [address] [address] [place] [address]  Language: English, Hmong, Sao Tomean, Spanish, Bruneian, Senegalese  Hours: Mon - Fri 9:00 AM - 5:00 PM  Fees: Free   Phone: (999) 521-9106 Website: https://[place].gov/dhs/people-we-serve/adults/economic-assistance/food-nutrition/programs-and-services/supplemental-nutrition-assistance-program.jsp     [employer] or free meals  7  [facility] [place] [facility] - Free Summer Meals Distance: 1.96 miles      In-Person   [address] [facility] [place] [address]  Language: English, Hmong, Bruneian  Hours: Mon - Fri 12:00 PM - 1:00 PM , Mon - Fri 3:00 PM - 4:00 PM  Fees: Free   Phone: (167) 804-2630 Email: Joceline@.[facility]. Website: https://www.[facility]/departments/grace-recreation/[facility]     8  [facility] Office - Lo[facility] and [facility] Distance: 2.22 miles      [address] [address]  Language: English  Hours: Mon - Fri 5:00 PM - 6:00 PM  Fees: Free   Phone: (636) 292-4956 Email: huma@[employer].org Website: http://www.[employer].org/          Important Numbers & Websites       Emergency Services   911  City Services   311  Poison Control   (873) 600-9453  Suicide Prevention Lifeline   (848) 104-6287 (TALK)  Child Abuse Hotline   (192) 889-1621 (4-A-Child)  Sexual Assault Hotline   (868) 694-1094 (HOPE)  National Runaway Safeline   (440) 647-3964 (RUNAWAY)  All-Options Talkline   (898) 546-2249  Substance Abuse Referral   (984) 653-2635 (HELP)

## 2023-12-12 ENCOUNTER — PATIENT OUTREACH (OUTPATIENT)
Dept: CARE COORDINATION | Facility: CLINIC | Age: 28
End: 2023-12-12
Payer: COMMERCIAL

## 2023-12-12 ASSESSMENT — ACTIVITIES OF DAILY LIVING (ADL): DEPENDENT_IADLS:: INDEPENDENT

## 2023-12-12 NOTE — PROGRESS NOTES
Clinic Care Coordination Contact  Community Health Worker Follow Up  [facility]  ID [id number] ([place])    Care Gaps:     Health Maintenance Due   Topic Date Due    HEPATITIS B IMMUNIZATION (1 of 3 - 3-dose series) Never done    COVID-19 Vaccine (1) Never done    HIV SCREENING  Never done    HEPATITIS C SCREENING  Never done    DTAP/TDAP/TD IMMUNIZATION (1 - Tdap) Never done    INFLUENZA VACCINE (1) Never done     Postponed to next outreach.      Care Plan:   Care Plan: Financial Wellbeing       Problem: Patient expresses financial resource strain       Goal: Create an action plan to increase financial stability       Start Date: 10/19/2023    This Visit's Progress: 40% Recent Progress: 30%    Priority: High    Note:     Barriers: Income, employment   Strengths: Spouse motivated to get employment  Patient expressed understanding of goal: Yes  Action steps to achieve this goal:  1. I will assist my spouse in engaging with workforce center in the next month.   2. I will engage with FRW to assist energy and emergency assistance.   3. I will engage with CC monthly and request additional support as needed.                               Care Plan: Sleep       Problem: Impaired Sleep Pattern       Goal: Demonstrate improved sleep habits       Start Date: 10/19/2023 Expected End Date: 1/20/2024    This Visit's Progress: 20% Recent Progress: 20%    Priority: Medium    Note:     Barriers: Knowledge   Strengths: Willing to seek assistance   Patient expressed understanding of goal: yes  Action steps to achieve this goal:  1. I will schedule an appointment with PCP and discuss medications assistance in next month.   2. I will discuss sleep concerns at my next visit on 12/7/23 at 10:40AM with Dr. Lewis.  3. I will engage with CC monthly and requested additional support as needed.                                 Care Plan: Mental Health       Problem: Mental Health Symptoms Need Improvement       Goal:  Improve management of mental health symptoms and establish with mental health/psychosocial supports       Start Date: 10/19/2023 Expected End Date: 1/20/2024    This Visit's Progress: 20% Recent Progress: 20%    Priority: Medium    Note:     Barriers: knowledge   Strengths: Willing to seek assistance  Patient expressed understanding of goal: Yes  Action steps to achieve this goal:  1. I will schedule an appointment with pcp to discuss medications management for anxiety.  2. I will discuss sleep concerns at my next visit on 12/7/23 at 10:40AM with Dr. Lewis.  3. I will engage with CC monthly and request support as needed.                               Care Plan: Establish PCP Completed 12/12/2023      Problem: No Established Primary Care Provider  Resolved 12/12/2023      Goal: Establish with Primary Care Provider and complete an initial visit  Completed 12/12/2023      Start Date: 10/19/2023    This Visit's Progress: 100% Recent Progress: 20%    Priority: High    Note:     Barriers: Knowledge of pcp clinic close to my address.   Strengths: Motivated to establish care  Patient expressed understanding of goal: Yes  Action steps to achieve this goal:  1. I will review list from mail by [facility] and establish care with a provider from the list in next month.   2. I will choose a PCP after my appointment on 12/7/23 at 10:40AM with Dr. Lewis for preventive care visit.  3. I will engage with CC monthly and requested additional support as needed.                               Intervention and Education during outreach:   Patient confirmed she received Energy Assistance application and plans to mail it in soon. CHW encourage mailing application back at her earliest convenience.    Per chart review, patient completed 12/7 appointment with Dr. Lewis. Per visit note, sleep concerns not discussed. CHW offered to assist with follow up appointment. Patient declined and does not have any concerns at this time. CHW will clarify if okay to  completed goals with lead clinician.    Per chart review, anxiety concerns were not discussed at 12/7 visit. CHW offered to assist with follow up appointment. Patient declined and does not have any concerns at this time. CHW will clarify if okay to completed goals with lead clinician.     Patient confirmed, she will see Dr. Lewis as PCP.     CHW Plan:  CHW to follow up in 1 month. Routing to lead clinician CCSW for review.    [facility] Care Coordination  [facility]    Phone: [phone]

## 2023-12-13 ENCOUNTER — PATIENT OUTREACH (OUTPATIENT)
Dept: CARE COORDINATION | Facility: CLINIC | Age: 28
End: 2023-12-13
Payer: COMMERCIAL

## 2023-12-13 NOTE — PROGRESS NOTES
Care Coordination Clinician Chart Review    Situation: Patient chart reviewed by Care Coordinator.       Background: Care Coordination Program started: 10/13/2023. Initial assessment completed and patient-centered care plan(s) were developed with participation from patient. Lead CC handed patient off to CHW for continued outreaches.       Assessment: Per chart review, patient outreach completed by CC CHW on 12/12/2023.  Patient is actively working to accomplish goal(s). Patient's goal(s) appropriate and relevant at this time. Patient is due for updated Plan of Care.  Assessments will be completed annually or as needed/with change of patient status.      Care Plan: Financial Wellbeing       Problem: Patient expresses financial resource strain       Goal: Create an action plan to increase financial stability       Start Date: 10/19/2023    This Visit's Progress: 40% Recent Progress: 30%    Priority: High    Note:     Barriers: Income, employment   Strengths: Spouse motivated to get employment  Patient expressed understanding of goal: Yes  Action steps to achieve this goal:  1. I will assist my spouse in engaging with workforce center in the next month.   2. I will engage with FRW to assist energy and emergency assistance.   3. I will engage with CC monthly and request additional support as needed.                               Care Plan: Sleep       Problem: Impaired Sleep Pattern                   Care Plan: Mental Health       Problem: Mental Health Symptoms Need Improvement                        Plan/Recommendations: The patient will continue working with Care Coordination to achieve goal(s) as above. CHW will continue outreaches at minimum every 30 days and will involve Lead CC as needed or if patient is ready to move to Maintenance. Lead CC will continue to monitor CHW outreaches and patient's progress to goal(s) every 6 weeks.     Plan of Care updated and sent to patient: [facility]  Social  Work Care Stef ROL[facility]   Phone: [phone]

## 2024-01-02 ENCOUNTER — PATIENT OUTREACH (OUTPATIENT)
Dept: CARE COORDINATION | Facility: CLINIC | Age: 29
End: 2024-01-02
Payer: COMMERCIAL

## 2024-01-02 NOTE — PROGRESS NOTES
Clinic Care Coordination Contact  Program: Emergency assistance and energy assistance  County:[facility] Case #:  [facility] Worker:   Riaz #:   Subscriber #:   Renewal:  Date Applied:     FRW Outreach:   24  FRW called patient and left a vm with call back information. FRW will make outreach in one week.  Kely Woods   Financial Resource Worker  [facility] Care Coordination  [phone]    23 FRW called and patient did not get the energy assistance application in the mail so FRW mailed it out again and will follow up in 30 days   Kely Woods   Financial Resource Worker  [facility] Care Coordination  [phone]    23  FRW called patient and left a vm with call back information. FRW will make outreach in one week.  Kely Woods   Financial Resource Worker  [facility] Care Coordination  [phone]   23  FRW was unable to find an  at this time and moved outreach  Kely Woods   Financial Resource Worker  [facility] Care Coordination  [phone]   10/26/23 FRW called and talked with patient over the phone and she stated that she just got approved for emergency assistance and received the benefits last month due to this the patient would not qualify for emergency assistance at this time.  FRW did help patient with the energy assistance application and mailed it out today. FRW will follow up in 30 days  Kely Woods   Financial Resource Worker  [facility] Care Coordination  [phone]    SNAP/CASH Application Screenin. Have you had county benefits before? Yes   2. How many people in the household, do you eat/buy food together?4   3. What is your monthly income (include all tax members)?   4. Do you have a bank account?   5. Do you have any utility bills (electricity, rent, mortgage, phone, insurance, medical bills, etc.)?  Behind on rent 2 months    6. Do you have social security cards and/or green cards?     10/23/23 FRW unable to find a [employer]  at this time and will try back in 2 days.   Kely Woods   Financial Resource Worker  [facility]  Clinic Care Coordination  [phone]     Health Insurance:      Referral/Screening:  FRW Screening    Row Name 10/19/23 1200       County Benefits   Is patient requesting help applying for [facility] benefits? Yes       Have you recently applied for any [facility] benefits? Yes       What was applied for? SNAP;CASH       Do you buy/eat food together? Yes       What is the monthly gross income for the household (wages, social security, workers comp, and pension)? 0       Insurance:   [place]? Yes       Is this a new insurance application request? No       OTHER   Any other information for the FRW? Needs assistance with Emergency assistance and energy assistance

## 2024-01-11 ENCOUNTER — PATIENT OUTREACH (OUTPATIENT)
Dept: CARE COORDINATION | Facility: CLINIC | Age: 29
End: 2024-01-11
Payer: COMMERCIAL

## 2024-01-11 NOTE — PROGRESS NOTES
Clinic Care Coordination Contact  Program: Emergency assistance and energy assistance  County:[facility] Case #:  [facility] Worker:   Riaz #:   Subscriber #:   Renewal:  Date Applied:     FRW Outreach:   24: Outreach attempted x 2. Left message on voicemail indicating last outreach attempt.   Plan: FRW closed the FRW program and remove patient from panel. Patient can be referred back to FRW if needed.       Rimma Grey  Care   [facility] Care Coordination  [phone]    24  FRW called patient and left a vm with call back information. FRW will make outreach in one week.  Kely Woods   Financial Resource Worker  [facility] Care Coordination  [phone]    23 FRW called and patient did not get the energy assistance application in the mail so FRW mailed it out again and will follow up in 30 days   Kely Woods   Financial Resource Worker  [facility] Care Coordination  [phone]    23  FRW called patient and left a vm with call back information. FRW will make outreach in one week.  Kely Woods   Financial Resource Worker  [facility] Care Coordination  [phone]   23  FRW was unable to find an  at this time and moved outreach  Kely Woods   Financial Resource Worker  [facility] Care Coordination  [phone]   10/26/23 FRW called and talked with patient over the phone and she stated that she just got approved for emergency assistance and received the benefits last month due to this the patient would not qualify for emergency assistance at this time.  FRW did help patient with the energy assistance application and mailed it out today. FRW will follow up in 30 days  Kely Woods   Financial Resource Worker  [facility] Care Coordination  [phone]    SNAP/CASH Application Screenin. Have you had  [facility] benefits before? Yes   2. How many people in the household, do you eat/buy food together?4   3. What is your monthly income (include all tax members)?   4. Do you have a bank account?   5. Do you have any utility bills (electricity, rent, mortgage, phone, insurance, medical bills, etc.)?  Behind on rent 2 months   6. Do you have social security cards and/or green cards?     10/23/23 FRW unable to find a [employer]  at this time and will try back in 2 days.   Kely Woods   Financial Resource Worker  [facility]  Clinic Care Coordination  [phone]     Health Insurance:      Referral/Screening:  FRW Screening    Row Name 10/19/23 6533       [facility] Benefits   Is patient requesting help applying for [facility] benefits? Yes       Have you recently applied for any [facility] benefits? Yes       What was applied for? SNAP;CASH       Do you buy/eat food together? Yes       What is the monthly gross income for the household (wages, social security, workers comp, and pension)? 0       Insurance:   [place] insurance? Yes       Is this a new insurance application request? No       OTHER   Any other information for the FRW? Needs assistance with Emergency assistance and energy assistance

## 2024-01-12 ENCOUNTER — PATIENT OUTREACH (OUTPATIENT)
Dept: CARE COORDINATION | Facility: CLINIC | Age: 29
End: 2024-01-12
Payer: COMMERCIAL

## 2024-01-12 NOTE — PROGRESS NOTES
Clinic Care Coordination Contact  Follow Up Progress Note      Assessment: Pc to patient using language line  Na ID#[id number].  Writer follow up with she interested in support from [facility] for county benefits. Patient shared she still interested and hasn't received a call or message. Writer confirmed number and resend FRW referral. Patient is looking for assistance with SNAP and energy assistance. Writer also discussed and send food evans in area. Writer requested for patient to give me back if she unable to connect with FRW team in 1-2 weeks. Patient agreed to call writer. Writer inquire if spouse has been able to find job. Patient shared her spouse found a job as of November.    Inquired about sleep and anxiety. Patient shared she sleeping better and her anxiety is under control at this time. No other needs identified at this time.     Addendum 1/15 at 1130 am.   Spoke patient using language line  and informed due to her receiving emergency assistance in sept 2023, she would not be eligible till end of year. Patient noted understand, writer informed FRW can assist with SNAP and energy assistance. Patient noted she applied for SNAP in December however she interested in follow. Writer message FRW update her on patient having already applied for SNAP and informed she interested in energy assistance.     Care Gaps:    Health Maintenance Due   Topic Date Due    HEPATITIS B IMMUNIZATION (1 of 3 - 3-dose series) Never done    COVID-19 Vaccine (1) Never done    HIV SCREENING  Never done    HEPATITIS C SCREENING  Never done    DTAP/TDAP/TD IMMUNIZATION (1 - Tdap) Never done    INFLUENZA VACCINE (1) Never done    PHQ-2 (once per calendar year)  01/01/2024   Not discussed during this visit due to nature of call.     Care Plans  Care Plan: Financial Wellbeing       Problem: Patient expresses financial resource strain       Goal: Create an action plan to increase financial stability       Start Date:  10/19/2023    This Visit's Progress: 50% Recent Progress: 40%    Priority: High    Note:     Barriers: Income, employment   Strengths: Spouse motivated to get employment  Patient expressed understanding of goal: Yes  Action steps to achieve this goal:  1. I will assist my spouse in engaging with workforce center in the next month. Spouse found a job as of november 2023  2. I will engage with FRW to assist energy and emergency assistance.   3. I will engage with CC monthly and request additional support as needed.                               Care Plan: Nutrition       Problem: Diet management       Goal: Demonstrate improved diet management       Start Date: 1/12/2024    This Visit's Progress: 30%    Priority: High    Note:     Barriers: income, ability to complete renewal.   Strengths: Motivated   Patient expressed understanding of goal: Yes  Action steps to achieve this goal:  1. I will engage with FRW to apply/follow up with SNAP application in next 1-2 weeks.   2. I will review and utilize food bank in area in as needed.   3. I will engage with CC monthly and requested additional support as needed.                                   Intervention/Education provided during outreach: Pt reports understanding and denies any additional questions or concerns at this times. SW CC engaged in [employer]IDET communication during encounter.       Plan:  Patient will engage with FRW in next week and utilize food evans as needed. Patient will call writer if unable to connect with FRW in a week.     Care Coordinator will follow up in monthly       PAIGE Hernandez  Social Work Care Cooridinator   [facility]   Phone: [phone]

## 2024-01-12 NOTE — LETTER
Food panty     [facility]  [address] [place] [address]  [phone] or [phone]  https://[facility].org/  13. [employer] [place]  [address] [place] [address]  [phone]  http://www.[address] [place] [address].org/  14. [facility]  [address] [place] [address]  [phone]  https://[facility].org/    [employer] [facility]  [address] [place], [place] [address] [phone]  https://www.[facility] [www.[facility].com].org/  16. [facility]  [address] [place] [address] [phone]  http://[facility].org/community/[facility]/  17. [employer]  [address] [place] [phone]  https://www.[employer].org/  18. [employer] - Food, shelter or clothing  [address] [place] [address] [phone]  https://www.[facility].org

## 2024-01-15 ENCOUNTER — PATIENT OUTREACH (OUTPATIENT)
Dept: CARE COORDINATION | Facility: CLINIC | Age: 29
End: 2024-01-15
Payer: COMMERCIAL

## 2024-01-15 NOTE — PROGRESS NOTES
Clinic Care Coordination Contact  Program:  [facility] Case #:  [facility] Worker:   Riaz #:   Subscriber #:   Renewal:  Date Applied:     FRFLO Outreach:   1/15/24 FRW filled out energy assistance application on 10/26/23- patient doesn't qualify for Emergency assistance to she already received Emergency assistance in September of 2023 and it is not been a year.  FRW was not able to fine an  and will try back tomorrow   Kely Woods   Financial Resource Worker  [facility]  Clinic Care Coordination  [phone]        Health Insurance:      Referral/Screening:   SAURABH Screening    Row Name 01/12/24 1017       [facility] Benefits   Is patient requesting help applying for [facility] benefits? Yes       Have you recently applied for any [facility] benefits? Yes       What was applied for? SNAP;CASH       Do you buy/eat food together? Yes       What is the monthly gross income for the household (wages, social security, workers comp, and pension)? 0       Insurance:   [place]? Yes       Is this a new insurance application request? No       OTHER   Any other information for the FRW? Needs assistance with Emergency assistance,  energy assistance and SNAP.

## 2024-01-16 ENCOUNTER — PATIENT OUTREACH (OUTPATIENT)
Dept: CARE COORDINATION | Facility: CLINIC | Age: 29
End: 2024-01-16
Payer: COMMERCIAL

## 2024-01-16 NOTE — PROGRESS NOTES
Clinic Care Coordination Contact  Program:  County:[facility] Case #:  County Worker:   Riaz #:   Subscriber #:   Renewal:  Date Applied:     FRW Outreach:   1/16/24  FRW called patient and was unable to leave a vm with call back information. FRW will make outreach in one week.  Kely Woods   Financial Resource Worker  [facility] Care Coordination  [phone]    1/15/24 FRW filled out energy assistance application on 10/26/23- patient doesn't qualify for Emergency assistance to she already received Emergency assistance in September of 2023 and it is not been a year.  FRW was not able to fine an  and will try back tomorrow   Kely Woods   Financial Resource Worker  [facility] Care Coordination  [phone]        Health Insurance:      Referral/Screening:   SAURABH Screening    Row Name 01/12/24 1017       County Benefits   Is patient requesting help applying for Critical access hospital benefits? Yes       Have you recently applied for any Critical access hospital benefits? Yes       What was applied for? SNAP;CASH       Do you buy/eat food together? Yes       What is the monthly gross income for the household (wages, social security, workers comp, and pension)? 0       Insurance:   [place]? Yes       Is this a new insurance application request? No       OTHER   Any other information for the FRW? Needs assistance with Emergency assistance,  energy assistance and SNAP.

## 2024-01-19 ENCOUNTER — PATIENT OUTREACH (OUTPATIENT)
Dept: CARE COORDINATION | Facility: CLINIC | Age: 29
End: 2024-01-19
Payer: COMMERCIAL

## 2024-01-19 NOTE — PROGRESS NOTES
Clinic Care Coordination Contact  Community Health Worker Follow Up    Care Gaps:   Health Maintenance Due   Topic Date Due    HEPATITIS B IMMUNIZATION (1 of 3 - 3-dose series) Never done    COVID-19 Vaccine (1) Never done    HIV SCREENING  Never done    HEPATITIS C SCREENING  Never done    DTAP/TDAP/TD IMMUNIZATION (1 - Tdap) Never done    INFLUENZA VACCINE (1) Never done    PHQ-2 (once per calendar year)  01/01/2024     Declined due to Patient expressing that she is not interested in scheduling at this time.      Care Plan:   Care Plan: Financial Wellbeing       Problem: Patient expresses financial resource strain       Goal: Create an action plan to increase financial stability       Start Date: 10/19/2023    This Visit's Progress: 60% Recent Progress: 50%    Priority: High    Note:     Barriers: Income, employment   Strengths: Spouse motivated to get employment  Patient expressed understanding of goal: Yes  Action steps to achieve this goal:  1. I will assist my spouse in engaging with workforce center in the next month. Spouse found a job as of november 2023  2. I will engage with FRW to assist energy and emergency assistance.   3. I will engage with CC monthly and request additional support as needed.                               Care Plan: Nutrition       Problem: Diet management       Goal: Demonstrate improved diet management       Start Date: 1/12/2024    This Visit's Progress: 40% Recent Progress: 30%    Priority: High    Note:     Barriers: income, ability to complete renewal.   Strengths: Motivated   Patient expressed understanding of goal: Yes  Action steps to achieve this goal:  1. I will engage with FRW to apply/follow up with SNAP application in next 1-2 weeks.   2. I will review and utilize food bank in area in as needed.   3. I will engage with CC monthly and requested additional support as needed.                                   Intervention and Education during outreach:     Spoke to Patient  (Pu)  CHW Introduced intent of call regarding monthly follow up.   Patients reports that she has not been able to follow up with FRW. Further expressing that she will await for FRW to call Patient back. Patient inquired if/ me  Patient expressed that she went to the [facility] Office in December 2023 to reapply for [place] application in-person. Further expressing that she was told that she will get an update regarding her application status within 30 days.   Patient expressed that she would like additional support on obtaining food resources until they receive additional update regarding KATIANA application due to Patient expressing that it is hard to purchase groceries after they pay their bills.   CHW acknowledged and provided Patient with information of [facility] ([address] [place] [id number] - [phone] Hours; Monday, Tuesday, Thursday & Friday 9?AM-4:30?PM & Sunday 10:30?AM-1:30?PM & Provided [email or web address] and informed Patient that they have different information on locations that provide Community Meals and Grocery Distribution. Patient acknowledged.    CHW encourages Patient to contact CHW/ CCC Team if additional support is needed. CHW provides Patient with CHW's contact information. Patient acknowledged.     CHW Plan: Next CHW Outreach in One Month     SADE James  Clinic Care Coordination   [facility]   Phone: [phone]  [email or web address]

## 2024-01-25 ENCOUNTER — TELEPHONE (OUTPATIENT)
Dept: FAMILY MEDICINE | Facility: CLINIC | Age: 29
End: 2024-01-25
Payer: COMMERCIAL

## 2024-01-25 NOTE — TELEPHONE ENCOUNTER
Reason for Call:  Appointment Request    Patient requesting this type of appt:  remove IUD    Requested provider: Jose Lewis    Reason patient unable to be scheduled: Not within requested timeframe    When does patient want to be seen/preferred time: 3-7 days    Comments: Pt got IUD insert by another dr in a different state and would like to get it removed but PCP schedule is fully booked. Please call and advise on scheduling, thanks!    Okay to leave a detailed message?: yes at Home number on file [phone] (home)    Call taken on 1/25/2024 at 3:51 PM by Polo Talley

## 2024-01-26 NOTE — TELEPHONE ENCOUNTER
Spoke to patient w/ lang line and scheduled.     With Family Practice (Lilliana Bentley MD)  02/15/2024 at 4:30 PM 4:15 pm arrival

## 2024-01-29 ENCOUNTER — PATIENT OUTREACH (OUTPATIENT)
Dept: CARE COORDINATION | Facility: CLINIC | Age: 29
End: 2024-01-29
Payer: COMMERCIAL

## 2024-02-06 ENCOUNTER — PATIENT OUTREACH (OUTPATIENT)
Dept: CARE COORDINATION | Facility: CLINIC | Age: 29
End: 2024-02-06
Payer: COMMERCIAL

## 2024-02-15 ENCOUNTER — OFFICE VISIT (OUTPATIENT)
Dept: FAMILY MEDICINE | Facility: CLINIC | Age: 29
End: 2024-02-15
Payer: COMMERCIAL

## 2024-02-15 VITALS
BODY MASS INDEX: 23.4 KG/M2 | DIASTOLIC BLOOD PRESSURE: 58 MMHG | WEIGHT: 104.04 LBS | TEMPERATURE: 98.1 F | RESPIRATION RATE: 20 BRPM | OXYGEN SATURATION: 98 % | HEART RATE: 93 BPM | SYSTOLIC BLOOD PRESSURE: 106 MMHG | HEIGHT: 56 IN

## 2024-02-15 DIAGNOSIS — Z97.5 IUD (INTRAUTERINE DEVICE) IN PLACE: ICD-10-CM

## 2024-02-15 DIAGNOSIS — Z30.432 ENCOUNTER FOR REMOVAL OF INTRAUTERINE CONTRACEPTIVE DEVICE: Primary | ICD-10-CM

## 2024-02-15 PROCEDURE — 58301 REMOVE INTRAUTERINE DEVICE: CPT | Performed by: FAMILY MEDICINE

## 2024-02-15 NOTE — PROGRESS NOTES
IUD Removal:  SUBJECTIVE:    Is a pregnancy test required: No.  Was a consent obtained?  Yes    Pu Denny is a 29 year old female,No obstetric history on file., No LMP recorded. who presents today for IUD removal. Her [place] and she is unsure how long ago. She has not had problems with the IUD. She requests removal of the IUD because she desires to conceive      Today's PHQ-2 Score:       9/7/2023     3:57 PM   PHQ-2 ( 1999 [employer])   Q1: Little interest or pleasure in doing things 2   Q2: Feeling down, depressed or hopeless 1   PHQ-2 Score 3   Q1: Little interest or pleasure in doing things More than half the days   Q2: Feeling down, depressed or hopeless Several days   PHQ-2 Score 3       PROCEDURE:    A speculum exam was performed and the cervix was visualized. The IUD string was visualized. Using ring forceps, the string  was grasped and the IUD removed intact.  For future references, patient had a mirena. No issues, still had her period.     POST PROCEDURE:    The patient tolerated the procedure well. Patient was discharged in stable condition.    Call if bleeding, pain or fever occur. and Birth control counseling given.    Lilliana Bentley MD

## 2024-02-16 PROBLEM — Z97.5 IUD (INTRAUTERINE DEVICE) IN PLACE: Status: ACTIVE | Noted: 2024-02-16

## 2024-02-16 PROBLEM — Z97.5 IUD (INTRAUTERINE DEVICE) IN PLACE: Status: RESOLVED | Noted: 2024-02-16 | Resolved: 2024-02-16

## 2024-02-23 ENCOUNTER — PATIENT OUTREACH (OUTPATIENT)
Dept: CARE COORDINATION | Facility: CLINIC | Age: 29
End: 2024-02-23
Payer: COMMERCIAL

## 2024-02-23 NOTE — PROGRESS NOTES
Care Coordination Clinician Chart Review    Situation: Patient chart reviewed by Care Coordinator.       Background: Care Coordination Program started: 10/13/2023. Initial assessment completed and patient-centered care plan(s) were developed with participation from patient. Lead CC handed patient off to CHW for continued outreaches.       Assessment: Per chart review, patient outreach completed by CC CHW on 01/19/0024.  Patient is actively working to accomplish goal(s). Patient's goal(s) appropriate and relevant at this time. Patient is not due for updated Plan of Care.  Assessments will be completed annually or as needed/with change of patient status.      Care Plan: Financial Wellbeing       Problem: Patient expresses financial resource strain       Goal: Create an action plan to increase financial stability       Start Date: 10/19/2023    This Visit's Progress: 60% Recent Progress: 50%    Priority: High    Note:     Barriers: Income, employment   Strengths: Spouse motivated to get employment  Patient expressed understanding of goal: Yes  Action steps to achieve this goal:  1. I will assist my spouse in engaging with [employer] center in the next month. Spouse found a job as of november 2023  2. I will engage with FRW to assist energy and emergency assistance.   3. I will engage with CC monthly and request additional support as needed.                               Care Plan: Nutrition       Problem: Diet management       Goal: Demonstrate improved diet management       Start Date: 1/12/2024    This Visit's Progress: 40% Recent Progress: 30%    Priority: High    Note:     Barriers: income, ability to complete renewal.   Strengths: Motivated   Patient expressed understanding of goal: Yes  Action steps to achieve this goal:  1. I will engage with FRW to apply/follow up with SNAP application in next 1-2 weeks.   2. I will review and utilize food bank in area in as needed.   3. I will engage with CC monthly and  requested additional support as needed.                                      Plan/Recommendations: The patient will continue working with Care Coordination to achieve goal(s) as above. CHW will continue outreaches at minimum every 30 days and will involve Lead CC as needed or if patient is ready to move to Maintenance. Lead CC will continue to monitor CHW outreaches and patient's progress to goal(s) every 6 weeks.     Plan of Care updated and sent to patient: Lyla Raya [facility]  Social Work Care Cooridinator   [facility]   Phone: [phone]

## 2024-02-23 NOTE — PROGRESS NOTES
2/23/2024  Clinic Care Coordination Contact  Care Team Conversations    Received VM from patient 2-23-24 to call her back at [phone]    Rose : Scott ID#[id number]   Returned patient's VM.  Spoke with patient regarding VM  Patient stated her mother has citizenship interview on 3-18-24 and needs the doctor to write a letter so she does not need take the test.  Informed daughter that she needs medical waiver waiver N-648 form to be completed by healthcare provider.  Verify mother information. Mother is Patricio Denny.  Daughter will connect with [facility] 2-26-24 regarding medical waiver N-648 form for her mother    Alejandra Gonzalez  Community Health Worker  [facility]  Clinic Care Coordination  [email or web address]  [email or web address]   Office: [phone]  Fax: [phone]

## 2024-02-28 ENCOUNTER — NURSE TRIAGE (OUTPATIENT)
Dept: NURSING | Facility: CLINIC | Age: 29
End: 2024-02-28
Payer: COMMERCIAL

## 2024-02-28 ENCOUNTER — PATIENT OUTREACH (OUTPATIENT)
Dept: CARE COORDINATION | Facility: CLINIC | Age: 29
End: 2024-02-28
Payer: COMMERCIAL

## 2024-02-28 NOTE — TELEPHONE ENCOUNTER
"Nurse Triage SBAR    Is this a 2nd Level Triage? No    Situation/Background: Patient calling with concern of not feeling well in lower belly, does not feel well when sitting up or getting up and down. Symptoms started right after IUD removal, per patient. Home pregnancy test negative    IUD removed on 2/15/24. Now has had bloating and discomfort since the IUD removal.   Isidra, Community Health Worker as well as Sabine interpretor on the call. Verbal CTC to discuss health issues with with  present, was given by patient.    Assessment:   Denies pain  Feels pressure, and tightness in lower belly  Bleeding after IUD removal was present on 2/17 and 2/18/24. Bleeding has stopped.  Feels like belly is \"swollen like a pregnancy\"  No fever but frequent urination  Patient states it is difficult to lay on her back  Patient states she is having to adjust her clothes for the abdominal bloating    Recommendation: Per disposition, Go to ED now. Reviewed Care Advice with patient and verbalizes understanding. Declines additional questions. Advised patient to call back with any new or worsening symptoms. Patient verbalized understanding and agrees with plan.     Protocol Recommended Disposition: Emergency department    Jeny Gutierrez RN on 2/28/2024 at 3:39 PM  [facility] Nurse Advisors  Reason for Disposition   Abdomen bloating or swelling are main symptoms   MODERATE - SEVERE SWELLING of abdomen (e.g., looks very distended or swollen) that is NEW-ONSET or much worse    Additional Information   Negative: Passed out (i.e., fainted, collapsed and was not responding)   Negative: Shock suspected (e.g., cold/pale/clammy skin, too weak to stand, low BP, rapid pulse)   Negative: Sounds like a life-threatening emergency to the triager   Negative: Followed an abdomen (stomach) injury   Negative: Chest pain   Negative: Abdominal pain and pregnant < 20 weeks   Negative: Abdominal pain and pregnant 20 or more weeks   " Negative: Pain is mainly in upper abdomen (if needed ask: 'is it mainly above the belly button?')   Negative: Sounds like a life-threatening emergency to the triager   Negative: Chest pain   Negative: Menstrual cramps with bloating are main symptoms   Negative: Abdomen pain is main symptom and female   Negative: Abdomen pain is main symptom and male   Negative: Breathing difficulty is main symptom   Negative: Constipation is main symptom   Negative: Diarrhea is main symptom   Negative: Vomiting is main symptom    Protocols used: Abdominal Pain - Female-A-OH, Abdomen Bloating and Swelling-A-OH

## 2024-02-28 NOTE — PROGRESS NOTES
Clinic Care Coordination Contact  Community Health Worker Follow Up    Care Gaps:   Health Maintenance Due   Topic Date Due    HEPATITIS B IMMUNIZATION (1 of 3 - 19+ 3-dose series) Never done    DTAP/TDAP/TD IMMUNIZATION (1 - Tdap) Never done    INFLUENZA VACCINE (1) Never done    COVID-19 Vaccine (1 - 2023-24 season) Never done     Postponed to Next CHW Outreach     Care Plan:   Care Plan: Financial Wellbeing       Problem: Patient expresses financial resource strain       Goal: Create an action plan to increase financial stability       Start Date: 10/19/2023    This Visit's Progress: 70% Recent Progress: 60%    Priority: High    Note:     Barriers: Income, employment   Strengths: Spouse motivated to get employment  Patient expressed understanding of goal: Yes  Action steps to achieve this goal:  1. I will assist my spouse in engaging with [employer] center in the next month. Spouse found a job as of november 2023  2. I will engage with FRW to assist energy and emergency assistance.   3. I will engage with CC monthly and request additional support as needed.                               Care Plan: Nutrition       Problem: Diet management       Goal: Demonstrate improved diet management       Start Date: 1/12/2024    This Visit's Progress: 50% Recent Progress: 40%    Priority: High    Note:     Barriers: income, ability to complete renewal.   Strengths: Motivated   Patient expressed understanding of goal: Yes  Action steps to achieve this goal:  1. I will engage with FRW to apply/follow up with SNAP application in next 1-2 weeks.   2. I will review and utilize food bank in area in as needed.   3. I will engage with CC monthly and requested additional support as needed.                                   Intervention and Education during outreach:     Spoke to Patient (Cedric)  CHW Introduced intent of call regarding monthly follow up.   Patients reports that she is doing okay. Further expressing that she was informed to  submit pay stub. Patient indicated that she submitted pay stub about two weeks ago, further expressing that she is awaiting additional feedback.  Patient expressed that she doesn't believe she needs additional support regarding [place] application with FRW. Further expressing that she is still in the waiting period to hear additional information regarding her application. CHW acknowledged and encouraged Patient to contact CHW/ CCC Team if additional support is needed from FRW.   CHW inquired if Patient has received/ follow up regarding Patient's Mother getting a medical waiver waiver N-648 form to be completed by healthcare provider by Patient's Mother citizenship interview on 3-18-24 (Needs it, so that Patient's Mother doesn't need to take the test. Patient expressed that she did not hear from [facility] further requesting CHW support in scheduling an appointment with CC.    CHW connected with Patient's Lead CC (SWCC). Lead SWCC, Machelle Raya MSW expressed that CHW would need to follow up with Patient's Lead SWCC. CHW received Patient's Mother information (Patricio Baldwin 11/01/1964 [id number]). Patient's Mother Lead SWCC (Lynn Meraz LSW) was able to Join call with CHW,  and Patient via a Conference call.   Patient's Mother Lead SWCC (Lynn Meraz LSW) informed Patient that medical waiver waiver N-648 form to be completed by healthcare provider is a longer process, therefore it would not be ready by Patient's Mother citizenship interview on 3-18-24. Further expressing that Patient's Mother would need to reschedule citizenship interview. Patient expressed that she is able to do so with the support of [employer] Baldwin which has been helping with citizenship application.   Patient's Mother Lead SWCC (Lynn Meraz LSW) expressed that she will send a message to Patient's Mother PCP (Ria Wilson PA-C) to get an referral faxed to help with the starting the medical waiver waiver N-648 form.   Patient  expressed to CHW that she recently expressed that removed her IUD further expressing that ever since then she was expressing pain on her lower belly. And it hurts and feels uncomfortable when she stands up and down. Patient inquired if CHW could support with scheduling an appointment with PCP. CHW acknowledged and informed Patient that informed Patient that she could also speak with an Nurse Triage. Patient acknowledged.   CHW connected with  via Back Door Scheduling Line (Non-Hospital Discharge Appointment).  supported with getting connected with an RN via Nurse Triage Line.    Patient requested & approved for CHW to stay on the line via Conference call with Nurse Triage and .   Jeny Gutierrez, RN completed an assessment and based on Patient's responses that Patient would need to seek medication attention. Recommending Patient to go to the ER. Patient agreed and indicated that Patient's  will take her to the ER that is 3 minutes away from their house. Jeny Gutierrez, RN provided Nurse Triage Line contact information [phone] if additional support.    CHW encourages Patient to contact CHW/ CCC Team if additional support is needed. CHW provides Patient with CHW's contact information. Patient acknowledged.     CHW Plan: Next CHW Outreach in One Month     SADE James  Clinic Care Coordination   [facility]   Phone: [phone]  [email or web address]

## 2024-03-14 ENCOUNTER — OFFICE VISIT (OUTPATIENT)
Dept: FAMILY MEDICINE | Facility: CLINIC | Age: 29
End: 2024-03-14
Payer: COMMERCIAL

## 2024-03-14 VITALS
HEIGHT: 57 IN | SYSTOLIC BLOOD PRESSURE: 97 MMHG | RESPIRATION RATE: 16 BRPM | DIASTOLIC BLOOD PRESSURE: 66 MMHG | WEIGHT: 102.4 LBS | HEART RATE: 84 BPM | BODY MASS INDEX: 22.09 KG/M2 | TEMPERATURE: 98.1 F | OXYGEN SATURATION: 99 %

## 2024-03-14 DIAGNOSIS — N83.209 SIMPLE OVARIAN CYST: ICD-10-CM

## 2024-03-14 DIAGNOSIS — M54.6 BILATERAL THORACIC BACK PAIN, UNSPECIFIED CHRONICITY: ICD-10-CM

## 2024-03-14 DIAGNOSIS — Z78.9 ATTEMPTING TO CONCEIVE: ICD-10-CM

## 2024-03-14 DIAGNOSIS — Z32.02 PREGNANCY TEST NEGATIVE: ICD-10-CM

## 2024-03-14 DIAGNOSIS — R10.2 PELVIC PAIN IN FEMALE: Primary | ICD-10-CM

## 2024-03-14 LAB — HCG UR QL: NEGATIVE

## 2024-03-14 PROCEDURE — 81025 URINE PREGNANCY TEST: CPT | Performed by: FAMILY MEDICINE

## 2024-03-14 PROCEDURE — 99213 OFFICE O/P EST LOW 20 MIN: CPT | Performed by: FAMILY MEDICINE

## 2024-03-14 RX ORDER — ACETAMINOPHEN 500 MG
500 TABLET ORAL EVERY 6 HOURS PRN
Qty: 100 TABLET | Refills: 0 | Status: SHIPPED | OUTPATIENT
Start: 2024-03-14

## 2024-03-14 NOTE — PROGRESS NOTES
"  Pelvic pain in female  Improved.    Simple ovarian cyst  Discussed ultrasound findings with the patient.  Informed it is a simple cyst, observation only for now.  Patient voiced understanding.    Pregnancy test negative  Pregnancy test performed per patient's request.    Attempting to conceive  LMP 2/16/2024.    Bilateral thoracic back pain, unspecified chronicity  - acetaminophen (TYLENOL) 500 MG tablet; Take 1 tablet (500 mg) by mouth every 6 hours as needed for mild pain     Subjective   Pu is a 29 year old, presenting for ED follow-up.  She was seen at [facility] emergency department on 2/18/2024 due to pelvic pain.  IUD was removed 2 weeks prior to the ED visit.  Ultrasound showed more uterus and simple appearing 4.1 cm right ovarian cyst likely physiologic.  The patient was explained the finding without interpreters during the ED visit , the hospital was not able to get [facility]  at that time.  States she has been worried since the ED visit, she is afraid she might have cancer.    She has 2 children ages 10 and 9 years old.  They are actively trying to conceive again.  Abdominal pain is improving now but complaining of mid back pain bilaterally.  No blood in the urine.  No fever.  The pain is not severe and it comes and goes.  No acute pain today.          3/14/2024     1:16 PM   Additional Questions   Roomed by chela contreras   Accompanied by brother       Review of Systems  CONSTITUTIONAL: NEGATIVE for fever, chills, change in weight  RESP: NEGATIVE for significant cough or SOB  CV: NEGATIVE for chest pain/chest pressure      Objective    BP 97/66 (BP Location: Left arm, Patient Position: Sitting, Cuff Size: Adult Regular)   Pulse 84   Temp 98.1  F (36.7  C) (Oral)   Resp 16   Ht 1.44 m (4' 8.69\")   Wt 46.4 kg (102 lb 6.4 oz)   LMP 02/18/2024 (Approximate)   SpO2 99%   BMI 22.40 kg/m    Body mass index is 22.4 kg/m .  Physical Exam   GENERAL: alert and no distress  NECK: Supple   RESP: " lungs clear to auscultation - no rales, rhonchi or wheezes  CV: regular rates and rhythm and no murmur, click or rub  ABDOMEN: soft, nontender, no hepatosplenomegaly, no masses and bowel sounds normal  MS: no gross musculoskeletal defects noted, no edema  BACK: no CVA tenderness, no paralumbar tenderness  PSYCH: mentation appears normal, affect normal/bright    This transcription uses voice recognition software, which may contain typographical errors.          Signed Electronically by: Jose Lewis MD

## 2024-03-14 NOTE — PROGRESS NOTES
"  Hospital Follow-up Visit:    Hospital/Nursing Home/IP Rehab Facility:  [facility]   Date of Admission: 02/28/2024  Date of Discharge: 02/28/2024  Reason(s) for Admission: Pelvic  pain   3  Was your hospitalization related to COVID-19? No   Problems taking medications regularly:  None  Medication changes since discharge: None  Problems adhering to non-medication therapy:  None    Summary of hospitalization:  {:296653}  Diagnostic Tests/Treatments reviewed.  Follow up needed: {:650205:}  Other Healthcare Providers Involved in Patient s Care:         {those currently involved after discharge:652407::\"None\"}  Update since discharge: {:355640} {TIP  Include information from family/caregivers, SNF, Care Coordination :897201}        Plan of care communicated with {:975680}     {Reference  Coding guidelines- Moderate Complexity F2F/Video within 7 - 14 days of discharge 2550603, High Complexity F2F/Video within 7 days 2868188 or [id number] days [id number] :283866}      {additonal problems for provider to add (Optional):469597}  "

## 2024-03-21 ENCOUNTER — NURSE TRIAGE (OUTPATIENT)
Dept: NURSING | Facility: CLINIC | Age: 29
End: 2024-03-21
Payer: COMMERCIAL

## 2024-03-21 NOTE — TELEPHONE ENCOUNTER
Call conducted with a [facility]     Nurse Triage SBAR    Is this a 2nd Level Triage? NO    Situation: Vaginal bleeding    Background: patient calling, states that she has had heavy vaginal bleeding for 3 days.  She states that she is going through 2-3 pads per hour.  She denies abdominal pain. She states the blood is bright red, dark red and there are big clots.  She states she feels very tired but denies dizziness.     Assessment: Severe vaginal bleeding    Protocol Recommended Disposition:   Go To ED/UCC Now (Or To Office With PCP Approval)    Recommendation: Patient willing to go to the ED but does not have transportation. Writer called 911 with patient and  on the phone. EMS was on their way at the ending of the call.      N/A    ANNIE PEARSON RN    Does the patient meet one of the following criteria for ADS visit consideration? No    Reason for Disposition   SEVERE vaginal bleeding (e.g., soaking 2 pads or tampons per hour and present 2 or more hours; 1 menstrual cup every 2 hours)    Additional Information   Negative: SEVERE vaginal bleeding (e.g., continuous red blood from vagina, or large blood clots) and very weak (can't stand)   Negative: Passed out (i.e., fainted, collapsed and was not responding)   Negative: Difficult to awaken or acting confused (e.g., disoriented, slurred speech)   Negative: Shock suspected (e.g., cold/pale/clammy skin, too weak to stand, low BP, rapid pulse)   Negative: Sounds like a life-threatening emergency to the triager   Negative: Pregnant 20 or more weeks (5 months or more)   Negative: Pregnant < 20 weeks (less than 5 months)   Negative: Postpartum (from 0 to 6 weeks after delivery)   Negative: Vaginal discharge is main symptom and bleeding is slight   Negative: SEVERE abdominal pain (e.g., excruciating)   Negative: SEVERE dizziness (e.g., unable to stand, requires support to walk, feels like passing out now)    Protocols used: Vaginal Bleeding -  [id number]

## 2024-03-29 ENCOUNTER — PATIENT OUTREACH (OUTPATIENT)
Dept: CARE COORDINATION | Facility: CLINIC | Age: 29
End: 2024-03-29
Payer: COMMERCIAL

## 2024-03-29 ASSESSMENT — ACTIVITIES OF DAILY LIVING (ADL)
DEPENDENT_IADLS:: INDEPENDENT
DEPENDENT_IADLS:: INDEPENDENT

## 2024-03-29 NOTE — PROGRESS NOTES
Clinic Care Coordination Contact  Community Health Worker Follow Up  Spoke with patient through [facility]  ID [id number]    Care Gaps:     Health Maintenance Due   Topic Date Due    HEPATITIS B IMMUNIZATION (1 of 3 - 19+ 3-dose series) Never done    DTAP/TDAP/TD IMMUNIZATION (1 - Tdap) Never done    INFLUENZA VACCINE (1) Never done    COVID-19 Vaccine (1 - 2023-24 season) Never done     Postponed to next outreach.     Care Plan:   Care Plan: Financial Wellbeing       Problem: Patient expresses financial resource strain       Goal: Create an action plan to increase financial stability       Start Date: 10/19/2023    This Visit's Progress: 70% Recent Progress: 60%    Priority: High    Note:     Barriers: Income, employment   Strengths: Spouse motivated to get employment  Patient expressed understanding of goal: Yes  Action steps to achieve this goal:  1. I will assist my spouse in engaging with [employer] center in the next month. Spouse found a job as of november 2023  2. I will engage with FRW to assist energy and emergency assistance.   3. I will engage with CC monthly and request additional support as needed.                                      Care Plan: Nutrition Completed 3/29/2024      Problem: Diet management  Resolved 3/29/2024      Goal: Demonstrate improved diet management  Completed 3/29/2024      Start Date: 1/12/2024    This Visit's Progress: 100% Recent Progress: 50%    Priority: High    Note:     Barriers: income, ability to complete renewal.   Strengths: Motivated   Patient expressed understanding of goal: Yes  Action steps to achieve this goal:  1. I will engage with FRW to apply/follow up with SNAP application in next 1-2 weeks.   2. I will review and utilize food bank in area in as needed.   3. I will engage with CC monthly and requested additional support as needed.                          Intervention and Education during outreach:   Per chart review, nurse triaged stiven on 3/21/24 for  vaginal bleeding, EMS was on their way at the ending of the call. CHW inquired if patient went to the hospital after call with nurse. Patient shares that she was checked by EMS nurse and was told she did not need to go to the hospital. Patient denied having anymore vaginal bleeding. Patient declined follow up with PCP and agreed with CHW notifying PCP of this. CHW to send message in separate encounter.     CHW inquired about status of SNAP, energy and emergency assistance. Patient shares she was approved for SNAP. She was told she'd have to wait to apply for energy and emergency assistance and is unsure why. Informed patient, CHW to follow up with FRW and will place new referral as needed.     CHW Plan: CHW to follow up in 1 month. Routing to lead clinician CCSW and FRW for review.    Rhiannon Martinez  [facility] Care Coordination  [facility]    Phone: [phone]

## 2024-04-02 DIAGNOSIS — N93.9 VAGINAL BLEEDING: Primary | ICD-10-CM

## 2024-04-03 ENCOUNTER — OFFICE VISIT (OUTPATIENT)
Dept: FAMILY MEDICINE | Facility: CLINIC | Age: 29
End: 2024-04-03
Payer: COMMERCIAL

## 2024-04-03 VITALS
HEART RATE: 73 BPM | SYSTOLIC BLOOD PRESSURE: 107 MMHG | BODY MASS INDEX: 23.51 KG/M2 | TEMPERATURE: 99.2 F | RESPIRATION RATE: 16 BRPM | WEIGHT: 104.5 LBS | OXYGEN SATURATION: 98 % | HEIGHT: 56 IN | DIASTOLIC BLOOD PRESSURE: 71 MMHG

## 2024-04-03 DIAGNOSIS — N93.9 VAGINAL BLEEDING: ICD-10-CM

## 2024-04-03 DIAGNOSIS — N83.201 CYST OF RIGHT OVARY: Primary | ICD-10-CM

## 2024-04-03 LAB
ANION GAP SERPL CALCULATED.3IONS-SCNC: 8 MMOL/L (ref 7–15)
BASOPHILS # BLD AUTO: 0 10E3/UL (ref 0–0.2)
BASOPHILS NFR BLD AUTO: 0 %
BUN SERPL-MCNC: 10.2 MG/DL (ref 6–20)
CALCIUM SERPL-MCNC: 9 MG/DL (ref 8.6–10)
CHLORIDE SERPL-SCNC: 105 MMOL/L (ref 98–107)
CREAT SERPL-MCNC: 0.75 MG/DL (ref 0.51–0.95)
DEPRECATED HCO3 PLAS-SCNC: 24 MMOL/L (ref 22–29)
EGFRCR SERPLBLD CKD-EPI 2021: >90 ML/MIN/1.73M2
EOSINOPHIL # BLD AUTO: 0.2 10E3/UL (ref 0–0.7)
EOSINOPHIL NFR BLD AUTO: 3 %
ERYTHROCYTE [DISTWIDTH] IN BLOOD BY AUTOMATED COUNT: 12 % (ref 10–15)
FERRITIN SERPL-MCNC: 125 NG/ML (ref 6–175)
GLUCOSE SERPL-MCNC: 87 MG/DL (ref 70–99)
HCT VFR BLD AUTO: 39.8 % (ref 35–47)
HGB BLD-MCNC: 13.7 G/DL (ref 11.7–15.7)
IMM GRANULOCYTES # BLD: 0 10E3/UL
IMM GRANULOCYTES NFR BLD: 0 %
LYMPHOCYTES # BLD AUTO: 2 10E3/UL (ref 0.8–5.3)
LYMPHOCYTES NFR BLD AUTO: 28 %
MCH RBC QN AUTO: 30.6 PG (ref 26.5–33)
MCHC RBC AUTO-ENTMCNC: 34.4 G/DL (ref 31.5–36.5)
MCV RBC AUTO: 89 FL (ref 78–100)
MONOCYTES # BLD AUTO: 0.5 10E3/UL (ref 0–1.3)
MONOCYTES NFR BLD AUTO: 8 %
NEUTROPHILS # BLD AUTO: 4.5 10E3/UL (ref 1.6–8.3)
NEUTROPHILS NFR BLD AUTO: 62 %
PLATELET # BLD AUTO: 191 10E3/UL (ref 150–450)
POTASSIUM SERPL-SCNC: 3.5 MMOL/L (ref 3.4–5.3)
RBC # BLD AUTO: 4.47 10E6/UL (ref 3.8–5.2)
SODIUM SERPL-SCNC: 137 MMOL/L (ref 135–145)
WBC # BLD AUTO: 7.2 10E3/UL (ref 4–11)

## 2024-04-03 PROCEDURE — 82728 ASSAY OF FERRITIN: CPT | Performed by: FAMILY MEDICINE

## 2024-04-03 PROCEDURE — 85025 COMPLETE CBC W/AUTO DIFF WBC: CPT | Performed by: FAMILY MEDICINE

## 2024-04-03 PROCEDURE — 99214 OFFICE O/P EST MOD 30 MIN: CPT | Performed by: FAMILY MEDICINE

## 2024-04-03 PROCEDURE — 80048 BASIC METABOLIC PNL TOTAL CA: CPT | Performed by: FAMILY MEDICINE

## 2024-04-03 PROCEDURE — 36415 COLL VENOUS BLD VENIPUNCTURE: CPT | Performed by: FAMILY MEDICINE

## 2024-04-03 NOTE — PROGRESS NOTES
"  Assessment & Plan     Vaginal bleeding  She describes the bleeding as if it was a lot, however her labs are very reassuring - she is not anemic and her ferritin is high normal. We'll recheck her ultrasound and go from there. She preferred to do another ultrasound over self-monitoring for another month/period cycle.  I told her (at the visit) Your Hgb is normal, so you are not anemic. This is reassuring.  It is possible the ovarian cyst or the IUD removal caused the heavy bleeding.  - CBC with platelets and differential  - Ferritin  - Basic metabolic panel  (Ca, Cl, CO2, Creat, Gluc, K, Na, BUN)  - US Pelvic Complete with Transvaginal    Cyst of right ovary  Part of the reason to recheck the ultrasound - to see if/how the cyst has changed  - US Pelvic Complete with Transvaginal      Review of external notes as documented elsewhere in note  Ordering of each unique test  Prescription drug management  30 minutes spent by me on the date of the encounter doing chart review, history and exam, documentation and further activities per the note                Subjective   Pu is a 29 year old, presenting for the following health issues:  Menstrual Problem (Patient reports that her period 3/19-3/23/24 was really heavy with low back pain. Currently she doesn't have any vaginal bleeding or any pelvic pain at this time. She did go to the ER on 2/28/24 and found that she had an ovarian cyst.)        4/3/2024     8:07 AM   Additional Questions   Roomed by Prashanth WILLETT   Accompanied by n/a     History of Present Illness       Reason for visit:  Vaginal bleeding      Not pregnant.    Vaginal bleeding - 3/19-23/2024  Very heavy with clots  No bleeding since then.  There was no pain or cramping    Gets period monthly  Previously had an IUD. Removed 2/15    Has two children.  Hoping for another pregnancy.      Objective    /71   Pulse 73   Temp 99.2  F (37.3  C) (Oral)   Resp 16   Ht 1.435 m (4' 8.5\")   Wt 47.4 kg (104 lb 8 " oz)   LMP 03/19/2024 (Exact Date)   SpO2 98%   Breastfeeding No   BMI 23.02 kg/m    Body mass index is 23.02 kg/m .  Physical Exam   GENERAL: alert and no distress  SKIN: no suspicious lesions or rashes  PSYCH: mentation appears normal, affect flat, judgement and insight intact, and appearance well groomed    Results for orders placed or performed in visit on 04/03/24 (from the past 24 hour(s))   CBC with platelets and differential    Narrative    The following orders were created for panel order CBC with platelets and differential.  Procedure                               Abnormality         Status                     ---------                               -----------         ------                     CBC with platelets and d...[[id number]]                      Final result                 Please view results for these tests on the individual orders.   Ferritin   Result Value Ref Range    Ferritin 125 6 - 175 ng/mL   Basic metabolic panel  (Ca, Cl, CO2, Creat, Gluc, K, Na, BUN)   Result Value Ref Range    Sodium 137 135 - 145 mmol/L    Potassium 3.5 3.4 - 5.3 mmol/L    Chloride 105 98 - 107 mmol/L    Carbon Dioxide (CO2) 24 22 - 29 mmol/L    Anion Gap 8 7 - 15 mmol/L    Urea Nitrogen 10.2 6.0 - 20.0 mg/dL    Creatinine 0.75 0.51 - 0.95 mg/dL    GFR Estimate >90 >60 mL/min/1.73m2    Calcium 9.0 8.6 - 10.0 mg/dL    Glucose 87 70 - 99 mg/dL   CBC with platelets and differential   Result Value Ref Range    WBC Count 7.2 4.0 - 11.0 10e3/uL    RBC Count 4.47 3.80 - 5.20 10e6/uL    Hemoglobin 13.7 11.7 - 15.7 g/dL    Hematocrit 39.8 35.0 - 47.0 %    MCV 89 78 - 100 fL    MCH 30.6 26.5 - 33.0 pg    MCHC 34.4 31.5 - 36.5 g/dL    RDW 12.0 10.0 - 15.0 %    Platelet Count 191 150 - 450 10e3/uL    % Neutrophils 62 %    % Lymphocytes 28 %    % Monocytes 8 %    % Eosinophils 3 %    % Basophils 0 %    % Immature Granulocytes 0 %    Absolute Neutrophils 4.5 1.6 - 8.3 10e3/uL    Absolute Lymphocytes 2.0 0.8 - 5.3 10e3/uL     Absolute Monocytes 0.5 0.0 - 1.3 10e3/uL    Absolute Eosinophils 0.2 0.0 - 0.7 10e3/uL    Absolute Basophils 0.0 0.0 - 0.2 10e3/uL    Absolute Immature Granulocytes 0.0 <=0.4 10e3/uL           Signed Electronically by: Kassidy Murphy MD

## 2024-04-05 ENCOUNTER — TELEPHONE (OUTPATIENT)
Dept: FAMILY MEDICINE | Facility: CLINIC | Age: 29
End: 2024-04-05
Payer: COMMERCIAL

## 2024-04-05 ENCOUNTER — PATIENT OUTREACH (OUTPATIENT)
Dept: CARE COORDINATION | Facility: CLINIC | Age: 29
End: 2024-04-05
Payer: COMMERCIAL

## 2024-04-05 NOTE — TELEPHONE ENCOUNTER
Called pt and she stated she was seen at the ER yesterday regarding her symptoms. They told her that something in her ear is causing her to feel dizzy and she was given medication to help treat it. Pt was seen at [facility] and will follow treatment plan from ER.        Mushtaq Ross, BSN RN  [facility]

## 2024-04-05 NOTE — PROGRESS NOTES
Care Coordination Clinician Chart Review    Situation: Patient chart reviewed by Care Coordinator.       Background: Care Coordination Program started: 10/13/2023. Initial assessment completed and patient-centered care plan(s) were developed with participation from patient. Lead CC handed patient off to CHW for continued outreaches.       Assessment: Per chart review, patient outreach completed by CC CHW on 03/29/2024.  Patient is actively working to accomplish goal(s). Patient's goal(s) appropriate and relevant at this time. Patient is due for updated Plan of Care.  Assessments will be completed annually or as needed/with change of patient status.      Care Plan: Financial Wellbeing       Problem: Patient expresses financial resource strain       Goal: Create an action plan to increase financial stability       Start Date: 10/19/2023    This Visit's Progress: 70% Recent Progress: 60%    Priority: High    Note:     Barriers: Income, employment   Strengths: Spouse motivated to get employment  Patient expressed understanding of goal: Yes  Action steps to achieve this goal:  1. I will assist my spouse in engaging with workforce center in the next month. Spouse found a job as of november 2023  2. I will engage with FRW to assist energy and emergency assistance.   3. I will engage with CC monthly and request additional support as needed.                                    Plan/Recommendations: The patient will continue working with Care Coordination to achieve goal(s) as above. CHW will continue outreaches at minimum every 30 days and will involve Lead CC as needed or if patient is ready to move to Maintenance. Lead CC will continue to monitor CHW outreaches and patient's progress to goal(s) every 6 weeks.     Plan of Care updated and sent to patient: Yes, via mail    PAIGE Hernandez  Social Work Care [facility]   Phone: [phone]

## 2024-04-05 NOTE — LETTER
[facility]  Patient Centered Plan of Care  About Me:        Patient Name:  Cedric Baldwin    YOB: 1995  Age:         29 year old   Yamil MRN:    [id number] Telephone Information:  Home Phone [phone]   Mobile [phone]       Address:  [address]  [place] [address] Email address:  [email or web address]      Emergency Contact(s)    Name Relationship Lgl Grd Work Phone Home Phone Mobile Phone   1Quinn AVALOS BERENICE Brother    [phone]           Primary language:  Sabine      needed? Yes   [employer]:  [phone] op. 1  Other communication barriers:Language barrier    Preferred Method of Communication:     Current living arrangement: I live in a private home with family    Mobility Status/ Medical Equipment: Independent        Health Maintenance  Health Maintenance Reviewed: Up to date      My Access Plan  Medical Emergency 911   Primary Clinic Line [facility] [phone]   24 Hour Appointment Line [phone] or  [phone]) (toll-free)   24 Hour Nurse Line [phone] (toll-free)   Preferred Urgent Care [facility], [phone]     [facility]  [phone]     Preferred Pharmacy [facility] - [place], [place] - [address]     Behavioral Health Crisis Line The National Suicide Prevention Lifeline at 1-146.973.7023 or Text/Call 418           My Care Team Members  Patient Care Team         Relationship Specialty Notifications Start End    Jose Lewis MD PCP - General Family Medicine  10/19/23     Phone: [phone] Fax: [phone]         [address] [place] [address]    Jose Lewis MD Assigned PCP   8/9/23     Phone: [phone] Fax: [phone]         [address] [place] [address]    Rhiannon Martinez CHW Community Health Worker Primary Care - CC Admissions 10/13/23     Phone: [phone]         Dorota  Machelle HANEY MSW Lead Care Coordinator  Admissions 10/19/23                 My Care Plans  Self Management and Treatment Plan    Care Plan  Care Plan: Financial Wellbeing       Problem: Patient expresses financial resource strain       Goal: Create an action plan to increase financial stability       Start Date: 10/19/2023    This Visit's Progress: 70% Recent Progress: 60%    Priority: High    Note:     Barriers: Income, employment   Strengths: Spouse motivated to get employment  Patient expressed understanding of goal: Yes  Action steps to achieve this goal:  1. I will assist my spouse in engaging with [employer] center in the next month. Spouse found a job as of november 2023  2. I will engage with FRW to assist energy and emergency assistance.   3. I will engage with CC monthly and request additional support as needed.                                 Action Plans on File:                       Advance Care Plans/Directives:   Advanced Care Plan/Directives on file:   No    Discussed with patient/caregiver(s): No data recorded           My Medical and Care Information  Problem List   Patient Active Problem List   Diagnosis   (none) - all problems resolved or deleted      Current Medications and Allergies:  See printed Medication Report.    Care Coordination Start Date: 10/13/2023   Frequency of Care Coordination: monthly, more frequently as needed     Form Last Updated: 04/05/2024

## 2024-04-05 NOTE — TELEPHONE ENCOUNTER
----- Message from Marianne James MD sent at 4/4/2024 12:57 PM CDT -----  Please triage for dizziness, vomiting- are these new symptoms?       ----- Message -----  From: Kathrin Worrell MA  Sent: 4/4/2024  10:47 AM CDT  To: Kassidy Murphy MD; Marianne James MD    Spoke to pt with [employer]  to relay message.  Pt understood however, she mentioned this morning she woke up feeling dizzy, has nausea, fatigued, and vomiting liquid only. What does covering provider recommend?         Kassidy Murphy MD  4/3/2024  7:56 PM CDT       Team - please call patient with results. Let her know her blood is strong (not anemic) and her iron stores are high normal -- so she did not lose so much blood as to deplete her system. This is good. We'll see what her ultrasound shows.

## 2024-04-08 ENCOUNTER — HOSPITAL ENCOUNTER (OUTPATIENT)
Dept: ULTRASOUND IMAGING | Facility: HOSPITAL | Age: 29
Discharge: HOME | End: 2024-04-08
Attending: FAMILY MEDICINE | Admitting: FAMILY MEDICINE
Payer: COMMERCIAL

## 2024-04-08 DIAGNOSIS — N83.201 CYST OF RIGHT OVARY: ICD-10-CM

## 2024-04-08 DIAGNOSIS — N93.9 VAGINAL BLEEDING: ICD-10-CM

## 2024-04-08 PROCEDURE — 76830 TRANSVAGINAL US NON-OB: CPT

## 2024-04-16 ENCOUNTER — PATIENT OUTREACH (OUTPATIENT)
Dept: CARE COORDINATION | Facility: CLINIC | Age: 29
End: 2024-04-16
Payer: COMMERCIAL

## 2024-04-18 ENCOUNTER — OFFICE VISIT (OUTPATIENT)
Dept: FAMILY MEDICINE | Facility: CLINIC | Age: 29
End: 2024-04-18
Payer: COMMERCIAL

## 2024-04-18 VITALS
HEART RATE: 88 BPM | SYSTOLIC BLOOD PRESSURE: 96 MMHG | WEIGHT: 107 LBS | HEIGHT: 57 IN | BODY MASS INDEX: 23.08 KG/M2 | RESPIRATION RATE: 16 BRPM | DIASTOLIC BLOOD PRESSURE: 62 MMHG | OXYGEN SATURATION: 100 % | TEMPERATURE: 98.2 F

## 2024-04-18 DIAGNOSIS — R42 DIZZINESS: Primary | ICD-10-CM

## 2024-04-18 DIAGNOSIS — N83.201 CYST OF RIGHT OVARY: ICD-10-CM

## 2024-04-18 DIAGNOSIS — R10.2 PELVIC PAIN IN FEMALE: ICD-10-CM

## 2024-04-18 DIAGNOSIS — N92.0 MENORRHAGIA WITH REGULAR CYCLE: ICD-10-CM

## 2024-04-18 PROCEDURE — 90471 IMMUNIZATION ADMIN: CPT | Performed by: FAMILY MEDICINE

## 2024-04-18 PROCEDURE — 99213 OFFICE O/P EST LOW 20 MIN: CPT | Mod: 25 | Performed by: FAMILY MEDICINE

## 2024-04-18 PROCEDURE — 90686 IIV4 VACC NO PRSV 0.5 ML IM: CPT | Performed by: FAMILY MEDICINE

## 2024-04-18 NOTE — PROGRESS NOTES
"  Dizziness  Improving.   He thinks meclizine 25 mg is too strong, advised to take half tablet if dizziness recurs or worsen.    Menorrhagia with regular cycle  Normal hemoglobin 2 weeks ago.  No dizziness or fatigue today.    Cyst of right ovary  Normal pelvic ultrasound 2 weeks ago.    Pelvic pain in female  Improved.    Subjective   Pu is a 29 year old here for ED follow-up.  Seen in the ED on 4/4/2024, 2 weeks ago due to dizziness.  Urine pregnancy test was negative.  She was prescribed meclizine, stopped taking due to side effects.  She is not able to describe the side effect from meclizine but she thinks the medication is too strong.     She will reports every menstrual bleeding every month but cycles are normal.  She is currently on her menstrual period.  Hemoglobin was 13.7, 2 weeks ago.    She has history of ovarian cysts, last ultrasound showed resolved ovarian cyst.  Think pain is improving.      4/18/2024     9:29 AM   Additional Questions   Roomed by Jessica Lund   Accompanied by Self       Review of Systems  CONSTITUTIONAL: NEGATIVE for fever, chills, change in weight  RESP: NEGATIVE for significant cough or SOB  CV: NEGATIVE for chest pain/chest pressure      Objective    BP 96/62 (BP Location: Right arm, Patient Position: Sitting, Cuff Size: Adult Regular)   Pulse 88   Temp 98.2  F (36.8  C) (Oral)   Resp 16   Ht 1.435 m (4' 8.5\")   Wt 48.5 kg (107 lb)   LMP 04/16/2024 (Exact Date)   SpO2 100%   BMI 23.57 kg/m    Body mass index is 23.57 kg/m .    Physical Exam   GENERAL: alert and no distress  NECK: Supple.   RESP: lungs clear to auscultation - no rales, rhonchi or wheezes  CV: regular rate and rhythm, normal S1 S2  ABDOMEN: No tenderness on abdomen and pelvis today  PSYCH: mentation appears normal, affect normal/bright      This transcription uses voice recognition software, which may contain typographical errors.    Signed Electronically by: Jose Lewis MD    Answers submitted by the patient " for this visit:  General Questionnaire (Submitted on 4/18/2024)  Chief Complaint: Chronic problems general questions HPI Form  What is the reason for your visit today? : f/u menstrual period

## 2024-04-23 ENCOUNTER — PATIENT OUTREACH (OUTPATIENT)
Dept: CARE COORDINATION | Facility: CLINIC | Age: 29
End: 2024-04-23
Payer: COMMERCIAL

## 2024-04-29 ENCOUNTER — PATIENT OUTREACH (OUTPATIENT)
Dept: CARE COORDINATION | Facility: CLINIC | Age: 29
End: 2024-04-29
Payer: COMMERCIAL

## 2024-04-29 DIAGNOSIS — Z71.89 OTHER SPECIFIED COUNSELING: Primary | Chronic | ICD-10-CM

## 2024-04-29 ASSESSMENT — ACTIVITIES OF DAILY LIVING (ADL)
DEPENDENT_IADLS:: INDEPENDENT
DEPENDENT_IADLS:: INDEPENDENT

## 2024-04-29 NOTE — PROGRESS NOTES
Clinic Care Coordination Contact  Community Health Worker Follow Up  [facility] through [employer] [facility]  ID [id number]  Care Gaps:     Health Maintenance Due   Topic Date Due    HEPATITIS B IMMUNIZATION (1 of 3 - 19+ 3-dose series) Never done    DTAP/TDAP/TD IMMUNIZATION (1 - Tdap) Never done    COVID-19 Vaccine (1 - 2023-24 season) Never done     Postponed to next outreach.      Care Plan:   Care Plan: Financial Wellbeing       Problem: Patient expresses financial resource strain       Goal: Create an action plan to increase financial stability       Start Date: 10/19/2023    This Visit's Progress: 70% Recent Progress: 70%    Priority: High    Note:     Barriers: Income, employment   Strengths: Spouse motivated to get employment  Patient expressed understanding of goal: Yes  Action steps to achieve this goal:  1. I will assist my spouse in engaging with [employer] center in the next month. Spouse found a job as of november 2023  2. I will engage with FRW to assist Energy Assistance.   3. I will engage with CC monthly and request additional support as needed.     Note: New FRW referral for Energy Assistance only, submitted on 4/29/24. Patient declined needing Emergency Assistance.                                 Intervention and Education during outreach:   Per chart review, patient was approved for SNAP. FRW referral from Energy Assistance and Emergency Assistance closed due to unable to reach patient.   CHW inquired if patient is still needing support with above Energy and Emergency assistance. Patient states, she's only needing help applying for Energy Assistance. Patient confirmed, her spouse is still working, she is not however she's in the process of applying to her mom's PCA. She will notify CCC team if her employment status change. Patient is aware she also needs to notify her county worker if any change in income.   New FRW referral submitted for Energy Assistance today.     Patient requested  resources to apply for citizenship waiver. Patient shares she has not had any education and language is a barrier. Patient declined resources for citizenship classes and is only interested in eligibility for waiver. Informed patient, CHW to clarify with referral with PCP and will follow up with her.   CHW to create new goal once referral is clarified with PCP and will assist as needed.    CHW Plan: CHW to follow up in 1 month. Routing to PCP and lead clinician [facility] for review  Rhiannon Martinez  Clinic Care Coordination  [facility]    Phone: [phone]

## 2024-04-30 ENCOUNTER — PATIENT OUTREACH (OUTPATIENT)
Dept: CARE COORDINATION | Facility: CLINIC | Age: 29
End: 2024-04-30
Payer: COMMERCIAL

## 2024-04-30 DIAGNOSIS — Z55.9 PROBLEMS RELATED TO EDUCATION AND LITERACY, UNSPECIFIED: Primary | ICD-10-CM

## 2024-04-30 SDOH — EDUCATIONAL SECURITY - EDUCATION ATTAINMENT: PROBLEMS RELATED TO EDUCATION AND LITERACY, UNSPECIFIED: Z55.9

## 2024-05-10 ENCOUNTER — PATIENT OUTREACH (OUTPATIENT)
Dept: CARE COORDINATION | Facility: CLINIC | Age: 29
End: 2024-05-10
Payer: COMMERCIAL

## 2024-05-17 ENCOUNTER — PATIENT OUTREACH (OUTPATIENT)
Dept: CARE COORDINATION | Facility: CLINIC | Age: 29
End: 2024-05-17
Payer: COMMERCIAL

## 2024-05-17 NOTE — PROGRESS NOTES
Care Coordination Clinician Chart Review    Situation: Patient chart reviewed by Care Coordinator.       Background: Care Coordination Program started: 10/13/2023. Initial assessment completed and patient-centered care plan(s) were developed with participation from patient. Lead CC handed patient off to CHW for continued outreaches.       Assessment: Per chart review, patient outreach completed by CC CHW on 04/29/2024.  Patient is not actively working to accomplish goal(s). Patient's goal(s) appropriate and relevant at this time. Patient is due for updated Plan of Care.  Assessments will be completed annually or as needed/with change of patient status.      Care Plan: Financial Wellbeing       Problem: Patient expresses financial resource strain       Goal: Create an action plan to increase financial stability       Start Date: 10/19/2023    This Visit's Progress: 70% Recent Progress: 70%    Priority: High    Note:     Barriers: Income, employment   Strengths: Spouse motivated to get employment  Patient expressed understanding of goal: Yes  Action steps to achieve this goal:  1. I will assist my spouse in engaging with workforce center in the next month. Spouse found a job as of november 2023  2. I will engage with FRW to assist Energy Assistance.   3. I will engage with CC monthly and request additional support as needed.     Note: New FRW referral for Energy Assistance only, submitted on 4/29/24. Patient declined needing Emergency Assistance.                                        Plan/Recommendations: The patient will continue working with Care Coordination to achieve goal(s) as above. CHW will continue outreaches at minimum every 30 days and will involve Lead CC as needed or if patient is ready to move to Maintenance. Lead CC will continue to monitor CHW outreaches and patient's progress to goal(s) every 6 weeks.     Plan of Care updated and sent to patient: Lyla Raya, [facility]  Social Work [facility]  Stef ROLON [facility]   Phone: [phone]

## 2024-05-29 ENCOUNTER — PATIENT OUTREACH (OUTPATIENT)
Dept: CARE COORDINATION | Facility: CLINIC | Age: 29
End: 2024-05-29
Payer: COMMERCIAL

## 2024-05-29 DIAGNOSIS — Z71.89 OTHER SPECIFIED COUNSELING: Primary | Chronic | ICD-10-CM

## 2024-05-29 ASSESSMENT — ACTIVITIES OF DAILY LIVING (ADL): DEPENDENT_IADLS:: INDEPENDENT

## 2024-05-29 NOTE — PROGRESS NOTES
Clinic Care Coordination Contact  Community Health Worker Follow Up  [facility]  ID [id number]     Care Gaps:     Health Maintenance Due   Topic Date Due    HEPATITIS B IMMUNIZATION (1 of 3 - 19+ 3-dose series) Never done    DTAP/TDAP/TD IMMUNIZATION (1 - Tdap) Never done    COVID-19 Vaccine (1 - 2023-24 season) Never done     Postponed to next outreach.     Care Plan:   Care Plan: Financial Wellbeing       Problem: Patient expresses financial resource strain       Goal: Create an action plan to increase financial stability       Start Date: 10/19/2023    This Visit's Progress: 70% Recent Progress: 70%    Priority: High    Note:     Barriers: Income, employment   Strengths: Spouse motivated to get employment  Patient expressed understanding of goal: Yes  Action steps to achieve this goal:  1. I will assist my spouse in engaging with workforce center in the next month. Spouse found a job as of november 2023  2. I will engage with FRW to assist Energy Assistance.   3. I will engage with CC monthly and request additional support as needed.     Note: New FRW referral for Energy Assistance only, submitted on 4/29/24. Patient declined needing Emergency Assistance. New FRW referral submitted on 5/29/24 for Energy Assistance.                                   Care Plan: N648 Citizenship Medical Waiver       Problem: Citizenship       Goal: I will complete an N648 Citizenship Medical Waiver 90 days.       Start Date: 5/29/2024 Expected End Date: 8/29/2024    This Visit's Progress: 10%    Priority: High    Note:     Barriers:  Language barrier  Strengths: Agreeable to referral   Patient expressed understanding of goal: Yes     Action steps to achieve this goal:   1.  I will answer my phone when I am contacted by Fiorella Solo to schedule my evaluation.   2.  I will attend my appointment with Fiorella Solo as scheduled on TBD.   3.  I will follow up with CCC in the next month regarding this  goal.   Note: Referral to [facility] sent on 4/30/24 (see CHW encounter 4/29/24).                          Intervention and Education during outreach:   Per chart review, FRW referral closed on 5/10 due to unable to reach patient. Patient confirmed, she still needs help applying for Energy Assistance. New FRW referral submitted today, patient will watch for follow up call.     Per chart review, PCP placed referral on 4/30 to [facility] for citizenship assessment.   CHW reviewed above and inquired if patient received follow up. Patient shares that she has not. CHW reviewed, will follow up with [employer] and encouraged her to watch for follow up call.   Secure email sent to Maurice Talley at [facility]. New goal created.   1:03PM Message from Maurice at [facility]: Vu Jurado, Yes referral was received, Dr. Mcelroy is booked until end of June and we just open the rest of the year. I can schedule her for 7/1 at 12pm. Let me know if this works.  Patient informed of above and confirmed appointment on 7/1. CHW notified Maurice at [facility].      CHW Plan: CHW to follow up in 1 month    Rhiannon Martinez  Clinic Care Coordination  [facility]    Phone: [phone]

## 2024-05-31 ENCOUNTER — PATIENT OUTREACH (OUTPATIENT)
Dept: CARE COORDINATION | Facility: CLINIC | Age: 29
End: 2024-05-31
Payer: COMMERCIAL

## 2024-06-26 ENCOUNTER — PATIENT OUTREACH (OUTPATIENT)
Dept: CARE COORDINATION | Facility: CLINIC | Age: 29
End: 2024-06-26
Payer: COMMERCIAL

## 2024-06-26 ASSESSMENT — ACTIVITIES OF DAILY LIVING (ADL): DEPENDENT_IADLS:: INDEPENDENT

## 2024-06-26 NOTE — PROGRESS NOTES
Clinic Care Coordination Contact  Community Health Worker Follow Up  Spoke with patient - Sabine  ID [id number]    Care Gaps:     Health Maintenance Due   Topic Date Due    HEPATITIS B IMMUNIZATION (1 of 3 - 19+ 3-dose series) Never done    DTAP/TDAP/TD IMMUNIZATION (1 - Tdap) Never done    COVID-19 Vaccine (1 - 2023-24 season) Never done     CHW reviewed health maintenance due. Patient would like to clarify with PCP if immunizations are needed. Message sent to PCP.     Care Plan:   Care Plan: Financial Wellbeing       Problem: Patient expresses financial resource strain       Goal: Create an action plan to increase financial stability       Start Date: 10/19/2023    This Visit's Progress: 70% Recent Progress: 70%    Priority: High    Note:     Barriers: Income, employment   Strengths: Spouse motivated to get employment  Patient expressed understanding of goal: Yes  Action steps to achieve this goal:  1. I will assist my spouse in engaging with workforce center in the next month. Spouse found a job as of november 2023  2. I will engage with FRW to assist Energy Assistance.   3. I will engage with CC monthly and request additional support as needed.     Note: New FRW referral for Energy Assistance only, submitted on 4/29/24. Patient declined needing Emergency Assistance. New FRW referral submitted on 5/29/24 for Energy Assistance.                                   Care Plan: N648 Citizenship Medical Waiver       Problem: Citizenship       Goal: I will complete an N648 Citizenship Medical Waiver 90 days.       Start Date: 5/29/2024 Expected End Date: 8/29/2024    This Visit's Progress: 20% Recent Progress: 10%    Priority: High    Note:     Barriers:  Language barrier  Strengths: Agreeable to referral   Patient expressed understanding of goal: Yes     Action steps to achieve this goal:   1.  I will answer my phone when I am contacted by [facility] [employer] to schedule my evaluation.   2.  I will attend my  appointment with Fiorella Solo as scheduled on  7/1/24 at 12pm with Dr. Mcelroy.   3.  I will follow up with CCC in the next month regarding this goal.   Note: Referral to [employer] sent on 4/30/24 (see CHW encounter 4/29/24).                          Intervention and Education during outreach:   Patient will continue to work with FRW on Energy Assistance application.       CHW reminded patient of appointment with Fiorella [employer] on 7/1/24 at 12PM for citizenship waiver assessment.     Patient inquired about child support.  disconnected from call. CHW called [facility] [phone], [facility]  ID [id number] connected to call.   Patient states, she submitted paperwork for child support in Aug 2023 in [facility]. In Sept 2023, [facility] requested her bank account and routing numbers. She was also told additional information would be requested but never received any follow up. She has also switched evans since then. CHW offered follow up with CCSW, phone appointment scheduled on 6/27 at 11AM.     CHW Plan: CHW to follow up in 1 month. Routing to lead clinician CCSW for review.     Rhiannon Martinez  Clinic Care Coordination  [facility]    Phone: [phone]

## 2024-06-27 ENCOUNTER — PATIENT OUTREACH (OUTPATIENT)
Dept: FAMILY MEDICINE | Facility: CLINIC | Age: 29
End: 2024-06-27
Payer: COMMERCIAL

## 2024-06-27 NOTE — LETTER
[facility]  Patient Centered Plan of Care  About Me:        Patient Name:  Cedric Baldwin    YOB: 1995  Age:         29 year old   Yamil MRN:    [id number] Telephone Information:  Home Phone [phone]   Mobile [phone]       Address:  [address]  [place] [address] Email address:  [email or web address]      Emergency Contact(s)    Name Relationship Lgl Grd Work Phone Home Phone Mobile Phone   1Quinn AVALOS BERENICE Brother    [phone]           Primary language:  Sabine      needed? Yes   [facility]:  [phone] op. 1  Other communication barriers:Language barrier    Preferred Method of Communication:     Current living arrangement: I live in a private home with family    Mobility Status/ Medical Equipment: Independent        Health Maintenance  Health Maintenance Reviewed: Up to date      My Access Plan  Medical Emergency 911   Primary Clinic Line [facility] [phone]   24 Hour Appointment Line [phone] or  [phone]) (toll-free)   24 Hour Nurse Line [phone] (toll-free)   Preferred Urgent Care [facility], [phone]     [facility]  [phone]     Preferred Pharmacy [facility] - [place], [place] - [address]     Behavioral Health Crisis Line The National Suicide Prevention Lifeline at 1-425.918.1645 or Text/Call 478           My Care Team Members  Patient Care Team         Relationship Specialty Notifications Start End    Jose Lewis MD PCP - General Family Medicine  10/19/23     Phone: [phone] Fax: [phone]         [address] [place] [address]    Jose Lewis MD Assigned PCP   8/9/23     Phone: [phone] Fax: [phone]         [address] [place] [address]    Rhiannon Martinez CHW Community Health Worker Primary Care - CC Admissions 10/13/23     Phone: [phone]         Dorota  MANUEL Ray Lead Care Coordinator  Admissions 10/19/23     Woods Kely, MA Financial Resource Worker   5/29/24     Phone: [phone]                     My Care Plans  Self Management and Treatment Plan    Care Plan  Care Plan: Financial Wellbeing       Problem: Patient expresses financial resource strain       Goal: Create an action plan to increase financial stability       Start Date: 10/19/2023    This Visit's Progress: 70% Recent Progress: 70%    Priority: High    Note:     Barriers: Income, employment   Strengths: Spouse motivated to get employment  Patient expressed understanding of goal: Yes  Action steps to achieve this goal:  1. I will assist my spouse in engaging with workforce center in the next month. Spouse found a job as of november 2023  2. I will engage with FRW to assist Energy Assistance.   3. I will engage with CC monthly and request additional support as needed.     Note: New FRW referral for Energy Assistance only, submitted on 4/29/24. Patient declined needing Emergency Assistance. New FRW referral submitted on 5/29/24 for Energy Assistance.                                   Care Plan: N648 Citizenship Medical Waiver       Problem: Citizenship       Goal: I will complete an N648 Citizenship Medical Waiver 90 days.       Start Date: 5/29/2024 Expected End Date: 8/29/2024    This Visit's Progress: 50% Recent Progress: 20%    Priority: High    Note:     Barriers:  Language barrier  Strengths: Agreeable to referral   Patient expressed understanding of goal: Yes     Action steps to achieve this goal:   1.  I will answer my phone when I am contacted by [employer] to schedule my evaluation.   2.  I will attend my appointment with [employer] [facility] as scheduled on  7/1/24 at 12pm with Dr. Mcelroy. COMPLETED   3.  I will attend my appointment with Mckayla PALat [facility] [address] [place] [address], for support with N400 application process as scheduled on  Fri 7/5/24 at 11:30AM.   4.  I will follow up with CCC in the next month regarding this goal.   Note: Referral to [facility]lla [facility] sent on 4/30/24                              Action Plans on File:                       Advance Care Plans/Directives:   Advanced Care Plan/Directives on file:   No    Discussed with patient/caregiver(s): No data recorded           My Medical and Care Information  Problem List   Patient Active Problem List   Diagnosis    Problems related to education and literacy, unspecified      Current Medications and Allergies:  See printed Medication Report.    Care Coordination Start Date: 10/13/2023   Frequency of Care Coordination: monthly, more frequently as needed     Form Last Updated: 07/03/2024

## 2024-06-28 NOTE — PROGRESS NOTES
Clinic Care Coordination Contact  Follow Up Progress Note      Assessment: Spoke with patient for follow up.Patient shared she applied for child support back in August of 2023 and haven't heard back. Patient reported no mailing from child support office. Writer inquire about father whereabouts. Patient she does know, she believes he out of state. Writer informed usually [facility] and child support office will attempt to track him down however not benefits will be issued until they track him down. Writer encouraged for patient to follow up county. Writer provided education around the child support benefits and how benefits are issued. Writer informed patient benefits are not issue if they can track down their parent or if he/she doesn't work.     Care Gaps:    Health Maintenance Due   Topic Date Due    HEPATITIS B IMMUNIZATION (1 of 3 - 19+ 3-dose series) Never done    DTAP/TDAP/TD IMMUNIZATION (1 - Tdap) Never done    COVID-19 Vaccine (1 - 2023-24 season) Never done       Currently there are no Care Gaps.    Care Plans  Care Plan: Financial Wellbeing       Problem: Patient expresses financial resource strain       Goal: Create an action plan to increase financial stability       Start Date: 10/19/2023    This Visit's Progress: 70% Recent Progress: 70%    Priority: High    Note:     Barriers: Income, employment   Strengths: Spouse motivated to get employment  Patient expressed understanding of goal: Yes  Action steps to achieve this goal:  1. I will assist my spouse in engaging with workforce center in the next month. Spouse found a job as of november 2023  2. I will engage with FRW to assist Energy Assistance.   3. I will engage with CC monthly and request additional support as needed.     Note: New FRW referral for Energy Assistance only, submitted on 4/29/24. Patient declined needing Emergency Assistance. New FRW referral submitted on 5/29/24 for Energy Assistance.                                   Care Plan: N648  Citizenship Medical Waiver       Problem: Citizenship       Goal: I will complete an N648 Citizenship Medical Waiver 90 days.       Start Date: 5/29/2024 Expected End Date: 8/29/2024    This Visit's Progress: 20% Recent Progress: 10%    Priority: High    Note:     Barriers:  Language barrier  Strengths: Agreeable to referral   Patient expressed understanding of goal: Yes     Action steps to achieve this goal:   1.  I will answer my phone when I am contacted by [employer] to schedule my evaluation.   2.  I will attend my appointment with Fiorella Solo as scheduled on  7/1/24 at 12pm with Dr. Mcelroy.   3.  I will follow up with CCC in the next month regarding this goal.   Note: Referral to [employer] sent on 4/30/24 (see CHW encounter 4/29/24).                              Intervention/Education provided during outreach: Pt reports understanding and denies any additional questions or concerns at this times. SW CC engaged in [employer] communication during encounter.    Plan:  Patient will follow up county regarding child support if needed.     Care Coordinator will follow up in monthly     PAIGE Hernandez  Social Work Care Cooridinator   [facility]   Phone: [phone]

## 2024-07-01 ENCOUNTER — PATIENT OUTREACH (OUTPATIENT)
Dept: CARE COORDINATION | Facility: CLINIC | Age: 29
End: 2024-07-01
Payer: COMMERCIAL

## 2024-07-02 ENCOUNTER — PATIENT OUTREACH (OUTPATIENT)
Dept: CARE COORDINATION | Facility: CLINIC | Age: 29
End: 2024-07-02
Payer: COMMERCIAL

## 2024-07-02 ASSESSMENT — ACTIVITIES OF DAILY LIVING (ADL): DEPENDENT_IADLS:: INDEPENDENT

## 2024-07-02 NOTE — PROGRESS NOTES
Clinic Care Coordination Contact  Community Health Worker Follow Up  Shelley  ID [id number]    Intervention and Education during outreach:   9:32AM: Patient called, left message to return call.     CHW spoke with patient, she shares that she completed citizenship waiver assessment with [employer] yesterday. She was approved for waiver and given the paperwork but does not know what to do next. CHW suggested we call [employer] for support with next step and patient agreed.   Conference called Maurice at [employer], confirmed patient approved for N648 citizenship waiver and needs to file it with N400 application for naturalization. If she needs support with this she can call [employer] [phone]. There is a fee for services.     Conference called Mckayla at [employer] [phone], patient confirmed appointment on 7/5 at 11:30AM, location [address] [place] [address].    Patient cannot write and asked if CHW could send address above to her email account at [email or web address].  CHW reviewed that email is not a secure way to communicate however because information is not including PHI, CHW agreed to send address above to her email. CHW encourage to return call if she needs any further support. Patient expressed understanding.     CHW reminded patient to return call to FRW regarding Energy Assistance application.    CHW Plan: CHW to follow up in 1 month    Rhiannon Martinez  [facility] Care Coordination  [facility]    Phone: [phone]

## 2024-07-12 ENCOUNTER — PATIENT OUTREACH (OUTPATIENT)
Dept: CARE COORDINATION | Facility: CLINIC | Age: 29
End: 2024-07-12
Payer: COMMERCIAL

## 2024-08-06 ENCOUNTER — PATIENT OUTREACH (OUTPATIENT)
Dept: CARE COORDINATION | Facility: CLINIC | Age: 29
End: 2024-08-06
Payer: COMMERCIAL

## 2024-08-06 ASSESSMENT — ACTIVITIES OF DAILY LIVING (ADL): DEPENDENT_IADLS:: INDEPENDENT

## 2024-08-06 NOTE — PROGRESS NOTES
Clinic Care Coordination Contact  Community Health Worker Follow Up  Spoke with patient - Sabine  ID [id number]    Care Gaps:     Health Maintenance Due   Topic Date Due    HEPATITIS B IMMUNIZATION (1 of 3 - 19+ 3-dose series) Never done    DTAP/TDAP/TD IMMUNIZATION (1 - Tdap) Never done    COVID-19 Vaccine (1 - 2023-24 season) Never done     Postponed to next outreach.      Care Plan:   Care Plan: Financial Wellbeing       Problem: Patient expresses financial resource strain       Goal: Create an action plan to increase financial stability       Start Date: 10/19/2023    This Visit's Progress: 70% Recent Progress: 70%    Priority: High    Note:     Barriers: Income, employment   Strengths: Spouse motivated to get employment  Patient expressed understanding of goal: Yes  Action steps to achieve this goal:  1. I will assist my spouse in engaging with [employer] center in the next month. Spouse found a job as of november 2023  2. I will engage with FRW to assist Energy Assistance.   3. I will engage with CC monthly and request additional support as needed.     Note: New FRW referral for Energy Assistance only, submitted on 4/29/24. Patient declined needing Emergency Assistance. New FRW referral submitted on 5/29/24 for Energy Assistance.                                   Care Plan: N648 Citizenship Medical Waiver       Problem: Citizenship       Goal: I will complete an N648 Citizenship Medical Waiver 90 days.       Start Date: 5/29/2024 Expected End Date: 8/29/2024    This Visit's Progress: 80% Recent Progress: 50%    Priority: High    Note:     Barriers:  Language barrier  Strengths: Agreeable to referral   Patient expressed understanding of goal: Yes     Action steps to achieve this goal:   1.  I will answer my phone when I am contacted by [employer] to schedule my evaluation.   2.  I will attend my appointment with [employer] as scheduled on  7/1/24 at 12pm with Dr. Mcelroy. COMPLETED   3.  I  will attend my appointment with Mckayla PALat [employer] [facility] [address] [place] [address], for support with N400 application process as scheduled on Fri 7/5/24 at 11:30AM. COMPLETED, patient submitted all paperwork and was told to watch for follow up by mail.   4.  I will follow up with CCC in the next month regarding this goal.   Note: Referral to [employer] sent on 4/30/24                          Intervention and Education during outreach:   Patient inquired about Energy Assistance application. She just received a utility bill and is not sure if she was approved for energy assistance or note. Patient is wondering how you know if you are approved and how the funds are paid towards utility. Informed patient, will route message to FRW for clarification.     CHW inquired if patient went to 7/5 appointment with Mckayla at [facility] for support with N400 application. Patient confirmed she completed appointment and submitted all paperwork. She was told to watch for follow up by mail and will reach out to CC team for support as needed.     CHW Plan: CHW to follow up in 1 month. Routing to lead FRW and CCSW for review    Rhiannon Martinez  [facility] Care Coordination  [facility]    Phone: [phone]

## 2024-08-08 ENCOUNTER — PATIENT OUTREACH (OUTPATIENT)
Dept: CARE COORDINATION | Facility: CLINIC | Age: 29
End: 2024-08-08
Payer: COMMERCIAL

## 2024-08-08 NOTE — PROGRESS NOTES
Care Coordination Clinician Chart Review    Situation: Patient chart reviewed by Care Coordinator.       Background: Care Coordination Program started: 10/13/2023. Initial assessment completed and patient-centered care plan(s) were developed with participation from patient. Lead CC handed patient off to CHW for continued outreaches.       Assessment: Per chart review, patient outreach completed by CC CHW on 08/06/2024.  Patient is actively working to accomplish goal(s). Patient's goal(s) appropriate and relevant at this time. Patient is not due for updated Plan of Care.  Assessments will be completed annually or as needed/with change of patient status.      Care Plan: Financial Wellbeing       Problem: Patient expresses financial resource strain       Goal: Create an action plan to increase financial stability       Start Date: 10/19/2023    This Visit's Progress: 70% Recent Progress: 70%    Priority: High    Note:     Barriers: Income, employment   Strengths: Spouse motivated to get employment  Patient expressed understanding of goal: Yes  Action steps to achieve this goal:  1. I will assist my spouse in engaging with workforce center in the next month. Spouse found a job as of november 2023  2. I will engage with FRW to assist Energy Assistance.   3. I will engage with CC monthly and request additional support as needed.     Note: New FRW referral for Energy Assistance only, submitted on 4/29/24. Patient declined needing Emergency Assistance. New FRW referral submitted on 5/29/24 for Energy Assistance.                                   Care Plan: N648 Citizenship Medical Waiver       Problem: Citizenship       Goal: I will complete an N648 Citizenship Medical Waiver 90 days.       Start Date: 5/29/2024 Expected End Date: 8/29/2024    This Visit's Progress: 80% Recent Progress: 50%    Priority: High    Note:     Barriers:  Language barrier  Strengths: Agreeable to referral   Patient expressed understanding of goal:  Yes     Action steps to achieve this goal:   1.  I will answer my phone when I am contacted by [email or web address] to schedule my evaluation.   2.  I will attend my appointment with Fiorella Solo as scheduled on  7/1/24 at 12pm with Dr. Mcelroy. COMPLETED   3.  I will attend my appointment with Mckayla PALat [facility] [address] [place] [address], for support with N400 application process as scheduled on Fri 7/5/24 at 11:30AM. COMPLETED, patient submitted all paperwork and was told to watch for follow up by mail.   4.  I will follow up with CCC in the next month regarding this goal.   Note: Referral to [email or web address] sent on 4/30/24                                 Plan/Recommendations: The patient will continue working with Care Coordination to achieve goal(s) as above. CHW will continue outreaches at minimum every 30 days and will involve Lead CC as needed or if patient is ready to move to Maintenance. Lead CC will continue to monitor CHW outreaches and patient's progress to goal(s) every 6 weeks.     Plan of Care updated and sent to patient: Lyla Raya [facility]  Social Work Care Cooridinator   [facility]   Phone: [phone]

## 2024-09-05 ENCOUNTER — PATIENT OUTREACH (OUTPATIENT)
Dept: CARE COORDINATION | Facility: CLINIC | Age: 29
End: 2024-09-05
Payer: COMMERCIAL

## 2024-09-05 ASSESSMENT — ACTIVITIES OF DAILY LIVING (ADL): DEPENDENT_IADLS:: INDEPENDENT

## 2024-09-05 NOTE — PROGRESS NOTES
Clinic Care Coordination Contact  Community Health Worker Follow Up  Spoke with patient - Sabine  ID [id number]    Care Gaps:     Health Maintenance Due   Topic Date Due    HEPATITIS B IMMUNIZATION (1 of 3 - 19+ 3-dose series) Never done    DTAP/TDAP/TD IMMUNIZATION (1 - Tdap) Never done    INFLUENZA VACCINE (1) 09/01/2024    COVID-19 Vaccine (1 - 2023-24 season) Never done     Postponed to next outreach.      Care Plan:   Care Plan: Financial Wellbeing Completed 9/5/2024      Problem: Patient expresses financial resource strain  Resolved 9/5/2024      Goal: Create an action plan to increase financial stability  Completed 9/5/2024      Start Date: 10/19/2023    This Visit's Progress: 100% Recent Progress: 70%    Priority: High    Note:     Barriers: Income, employment   Strengths: Spouse motivated to get employment  Patient expressed understanding of goal: Yes  Action steps to achieve this goal:  1. I will assist my spouse in engaging with workforce center in the next month. Spouse found a job as of november 2023  2. I will engage with FRW to assist Energy Assistance.   3. I will engage with CC monthly and request additional support as needed.     Note: New FRW referral for Energy Assistance only, submitted on 4/29/24. Patient declined needing Emergency Assistance. New FRW referral submitted on 5/29/24 for Energy Assistance.                                          Care Plan: N648 Citizenship Medical Waiver       Problem: Citizenship       Goal: I will complete an N648 Citizenship Medical Waiver 90 days.       Start Date: 5/29/2024 Expected End Date: 8/29/2024    This Visit's Progress: 90% Recent Progress: 80%    Priority: High    Note:     Barriers:  Language barrier  Strengths: Agreeable to referral   Patient expressed understanding of goal: Yes     Action steps to achieve this goal:   1.  I will answer my phone when I am contacted by [employer] to schedule my evaluation.   2.  I will attend my  appointment with Fiorella [employer] as scheduled on  7/1/24 at 12pm with Dr. Mcelroy. COMPLETED   3.  I will attend my appointment with Mckayla PALat [employer] [address] [place] [address], for support with N400 application process as scheduled on Fri 7/5/24 at 11:30AM. COMPLETED, patient submitted all paperwork and was told to watch for follow up by mail. Patient completed biometric screening on 8/28/24.  4.  I will follow up with [facility] in the next month regarding this goal.   Note: Referral to [employer] sent on 4/30/24                       Intervention and Education during outreach:   Patient confirmed she was approved for Energy Assistance. No further support is needed. Goal completed.     CHW inquired if any update on citizenship process since submitting all paperwork. Patient shares that she received notification by mail from immigration services and completed biometric screening on 8/28. Patient will watch for follow up from [facility].    Patient share that her nexplanon was removed Feb 15, 2024 and she has not been able to get pregnant. CHW encouraged follow up with PCP, patient agreed, appointment scheduled on 10/7 at 10:10AM with Dr. Lewis.     CHW Plan: CHW to follow up in 1 month    Rhiannon Martinez  [facility] Care Coordination  [facility]    Phone: [phone]

## 2024-09-17 ENCOUNTER — PATIENT OUTREACH (OUTPATIENT)
Dept: CARE COORDINATION | Facility: CLINIC | Age: 29
End: 2024-09-17
Payer: COMMERCIAL

## 2024-09-17 NOTE — PROGRESS NOTES
Care Coordination Clinician Chart Review    Situation: Patient chart reviewed by Care Coordinator.       Background: Care Coordination Program started: 10/13/2023. Initial assessment completed and patient-centered care plan(s) were developed with participation from patient. Lead CC handed patient off to CHW for continued outreaches.       Assessment: Per chart review, patient outreach completed by CC CHW on 09/05/2024.  Patient is actively working to accomplish goal(s). Patient's goal(s) appropriate and relevant at this time. Patient is not due for updated Plan of Care.  Assessments will be completed annually or as needed/with change of patient status.      Care Plan: N648 Citizenship Medical Waiver       Problem: Citizenship       Goal: I will complete an N648 Citizenship Medical Waiver 90 days.       Start Date: 5/29/2024 Expected End Date: 8/29/2024    This Visit's Progress: 90% Recent Progress: 80%    Priority: High    Note:     Barriers:  Language barrier  Strengths: Agreeable to referral   Patient expressed understanding of goal: Yes     Action steps to achieve this goal:   1.  I will answer my phone when I am contacted by [employer] to schedule my evaluation.   2.  I will attend my appointment with [employer] as scheduled on  7/1/24 at 12pm with Dr. Mcelroy. COMPLETED   3.  I will attend my appointment with Mckayla PALat [facility] [place] [address], for support with N400 application process as scheduled on Fri 7/5/24 at 11:30AM. COMPLETED, patient submitted all paperwork and was told to watch for follow up by mail. Patient completed biometric screening on 8/28/24.  4.  I will follow up with CCC in the next month regarding this goal.   Note: Referral to [employer] sent on 4/30/24                                 Plan/Recommendations: The patient will continue working with Care Coordination to achieve goal(s) as above. CHW will continue outreaches at  minimum every 30 days and will involve Lead CC as needed or if patient is ready to move to Maintenance. Lead CC will continue to monitor CHW outreaches and patient's progress to goal(s) every 6 weeks.     Plan of Care updated and sent to patient: Lyla Raya [facility]  Social Work Care Cooridinator   [facility]   Phone: [phone]

## 2024-09-17 NOTE — LETTER
[facility]  Patient Centered Plan of Care  About Me:        Patient Name:  Cedric Baldwin    YOB: 1995  Age:         29 year old   Yamil MRN:    [id number] Telephone Information:  Home Phone [phone]   Mobile [phone]       Address:  [address]  [place] [address] Email address:  [email or web address]      Emergency Contact(s)    Name Relationship Lgl Grd Work Phone Home Phone Mobile Phone   1Quinn AVALOS BERENICE Brother    [phone]           Primary language:  Sabine      needed? Yes   [place] [employer]:  [phone] op. 1  Other communication barriers:Language barrier    Preferred Method of Communication:     Current living arrangement: I live in a private home with family    Mobility Status/ Medical Equipment: Independent        Health Maintenance  Health Maintenance Reviewed: Up to date      My Access Plan  Medical Emergency 911   Primary Clinic Line [facility] [phone]   24 Hour Appointment Line [phone] or  [phone] (toll-free)   24 Hour Nurse Line [phone] (toll-free)   Preferred Urgent Care [facility], [phone]     [facility]  [phone]     Preferred Pharmacy [facility] - [place], [place] - [address]     Behavioral Health Crisis Line The National Suicide Prevention Lifeline at 1-151.103.4043 or Text/Call 318           My Care Team Members  Patient Care Team         Relationship Specialty Notifications Start End    Jose Lewis MD PCP - General Family Medicine  10/19/23     Phone: [phone] Fax: [phone]         [address] [place] [address]    Jose Lewis MD Assigned PCP   8/9/23     Phone: [phone] Fax: [phone]         [address] [place] [address]    Rhiannon Martinez CHW Community Health Worker Primary Care - CC Admissions 10/13/23     Phone: [phone]         Dorota  MANUEL Ray Lead Care Coordinator  Admissions 10/19/23                 My Care Plans  Self Management and Treatment Plan    Care Plan  Care Plan: N648 Citizenship Medical Waiver       Problem: Citizenship       Goal: I will complete an N648 Citizenship Medical Waiver 90 days.       Start Date: 5/29/2024 Expected End Date: 8/29/2024    This Visit's Progress: 90% Recent Progress: 80%    Priority: High    Note:     Barriers:  Language barrier  Strengths: Agreeable to referral   Patient expressed understanding of goal: Yes     Action steps to achieve this goal:   1.  I will answer my phone when I am contacted by [employer] to schedule my evaluation.   2.  I will attend my appointment with [employer] as scheduled on  7/1/24 at 12pm with Dr. Mcelroy. COMPLETED   3.  I will attend my appointment with Mckayla PALat [employer] [address] [place] [address], for support with N400 application process as scheduled on Fri 7/5/24 at 11:30AM. COMPLETED, patient submitted all paperwork and was told to watch for follow up by mail. Patient completed biometric screening on 8/28/24.  4.  I will follow up with [facility] in the next month regarding this goal.   Note: Referral to [employer] sent on 4/30/24                              Action Plans on File:                       Advance Care Plans/Directives:   Advanced Care Plan/Directives on file:   No    Discussed with patient/caregiver(s): No data recorded           My Medical and Care Information  Problem List   Patient Active Problem List   Diagnosis    Problems related to education and literacy, unspecified      Current Medications and Allergies:  See printed Medication Report.    Care Coordination Start Date: 10/13/2023   Frequency of Care Coordination: monthly, more frequently as needed     Form Last Updated: 10/01/2024

## 2024-10-07 ENCOUNTER — OFFICE VISIT (OUTPATIENT)
Dept: FAMILY MEDICINE | Facility: CLINIC | Age: 29
End: 2024-10-07
Payer: COMMERCIAL

## 2024-10-07 VITALS
BODY MASS INDEX: 23.73 KG/M2 | RESPIRATION RATE: 16 BRPM | HEART RATE: 86 BPM | SYSTOLIC BLOOD PRESSURE: 106 MMHG | TEMPERATURE: 98.1 F | OXYGEN SATURATION: 99 % | WEIGHT: 110 LBS | DIASTOLIC BLOOD PRESSURE: 72 MMHG | HEIGHT: 57 IN

## 2024-10-07 DIAGNOSIS — N97.9 INABILITY TO CONCEIVE, FEMALE: ICD-10-CM

## 2024-10-07 DIAGNOSIS — N92.6 IRREGULAR PERIODS: Primary | ICD-10-CM

## 2024-10-07 PROCEDURE — 99214 OFFICE O/P EST MOD 30 MIN: CPT | Performed by: FAMILY MEDICINE

## 2024-10-07 PROCEDURE — G2211 COMPLEX E/M VISIT ADD ON: HCPCS | Performed by: FAMILY MEDICINE

## 2024-10-07 RX ORDER — NORGESTIMATE AND ETHINYL ESTRADIOL 7DAYSX3 LO
1 KIT ORAL DAILY
Qty: 84 TABLET | Refills: 0 | Status: SHIPPED | OUTPATIENT
Start: 2024-10-07

## 2024-10-07 ASSESSMENT — ENCOUNTER SYMPTOMS: PREGNANCY PROBLEM: 1

## 2024-10-08 ENCOUNTER — PATIENT OUTREACH (OUTPATIENT)
Dept: CARE COORDINATION | Facility: CLINIC | Age: 29
End: 2024-10-08
Payer: COMMERCIAL

## 2024-10-08 ASSESSMENT — ACTIVITIES OF DAILY LIVING (ADL): DEPENDENT_IADLS:: INDEPENDENT

## 2024-10-08 NOTE — PROGRESS NOTES
Clinic Care Coordination Contact  Community Health Worker Follow Up  Spoke with patient - Sabine  ID [id number]    Care Gaps:     Health Maintenance Due   Topic Date Due    HEPATITIS B IMMUNIZATION (1 of 3 - 19+ 3-dose series) Never done    DTAP/TDAP/TD IMMUNIZATION (1 - Tdap) Never done    INFLUENZA VACCINE (1) 09/01/2024    COVID-19 Vaccine (1 - 2024-25 season) Never done     Postponed to next outreach.     Care Plan:   Care Plan: N648 Citizenship Medical Waiver       Problem: Citizenship       Goal: I will complete an N648 Citizenship Medical Waiver 90 days.       Start Date: 5/29/2024 Expected End Date: 8/29/2024    This Visit's Progress: 90% Recent Progress: 90%    Priority: High    Note:     Barriers:  Language barrier  Strengths: Agreeable to referral   Patient expressed understanding of goal: Yes     Action steps to achieve this goal:   1.  I will answer my phone when I am contacted by [employer] to schedule my evaluation.   2.  I will attend my appointment with [employer] as scheduled on  7/1/24 at 12pm with Dr. Mcelroy. COMPLETED   3.  I will attend my appointment with Mckayla PALat [employer] [facility] [address] [place] [address], for support with N400 application process as scheduled on Fri 7/5/24 at 11:30AM. COMPLETED, patient submitted all paperwork and was told to watch for follow up by mail. Patient completed biometric screening on 8/28/24.  4.  I will follow up with [facility] in the next month regarding this goal.   Note: Referral to [employer] sent on 4/30/24                          Intervention and Education during outreach:   CHW inquired if any update on citizenship process since completing biometric screening on 8/28.   Patient received a letter notifying her of an appointment on 11/5/24. Patient cannot read so she's not sure if letter is from immigration office or regarding citizenship interview. Patient requested support with reviewing letter,  appointment scheduled 10/9 at 4PM with CHW.     CHW Plan: CHW to follow up in 1 business days    [facility]  [facility]    Phone: [phone]

## 2024-10-09 NOTE — PROGRESS NOTES
Sabine  ID [id number]    CHW spoke with patient. Patient states, she thought appointment was tomorrow. Patient would like to reschedule for tomorrow at 4PM.     Rhiannon Martinez  [facility] Care Coordination  [facility]    Phone: [phone]

## 2024-10-10 ENCOUNTER — APPOINTMENT (OUTPATIENT)
Dept: NURSING | Facility: CLINIC | Age: 29
End: 2024-10-10
Payer: COMMERCIAL

## 2024-10-10 ENCOUNTER — PATIENT OUTREACH (OUTPATIENT)
Dept: CARE COORDINATION | Facility: CLINIC | Age: 29
End: 2024-10-10

## 2024-10-10 NOTE — PROGRESS NOTES
Sabine  ID [id number]   CHW met with patient, reviewed letter from immigration office notifying patient of in-person appointment on 11/5/24 at 12:05PM for interview and items to bring. Patient expressed understanding and shares that family member will attend appointment with her.     CHW Plan: 1 month follow up    Rhiannon [facility] Care Coordination  [facility]    Phone: [phone]

## 2024-10-14 DIAGNOSIS — N92.6 IRREGULAR PERIODS: ICD-10-CM

## 2024-10-14 RX ORDER — DROSPIRENONE AND ETHINYL ESTRADIOL 0.03MG-3MG
1 KIT ORAL DAILY
Qty: 84 TABLET | Refills: 0 | Status: SHIPPED | OUTPATIENT
Start: 2024-10-14

## 2024-10-14 NOTE — TELEPHONE ENCOUNTER
Called patient, patient states she would like to try a different birth control pill.       Sol Dee, MSN, RN   [facility]

## 2024-10-15 NOTE — TELEPHONE ENCOUNTER
October 14, 2024  Jose Lewis MD   to [facility] - [facility]       10/14/24  1:19 PM   Alternative birth control pills sent to [facility].  Please notify the patient.    Dr. Jose Lewis  10/14/2024 1:19 PM      Got a hold of patient this morning to relay provider recommendation above.  An  was not needed per patient request.     Chris Hernandez RN  [facility] Primary Care [facility]

## 2024-10-15 NOTE — TELEPHONE ENCOUNTER
"Writer attempt #1 to call patient with the help of a \"Karenni\"  regarding clinician's message below. Writer unable to obtain a [employer]  after 10 minutes. Will have to call patient again another time, when an  is available.    If patient calls back, please relay clinician's message to them. Thanks!    MARICARMEN RamirezN, RN, PHN   [facility]    "

## 2024-10-17 ENCOUNTER — PATIENT OUTREACH (OUTPATIENT)
Dept: CARE COORDINATION | Facility: CLINIC | Age: 29
End: 2024-10-17
Payer: COMMERCIAL

## 2024-10-17 NOTE — LETTER
[facility]  Patient Centered Plan of Care  About Me:        Patient Name:  Cedric Baldwin    YOB: 1995  Age:         29 year old   Yamil MRN:    [id number] Telephone Information:  Home Phone [phone]   Mobile [phone]       Address:  [address]  [place] [address] Email address:  [email or web address]      Emergency Contact(s)    Name Relationship Lgl Grd Work Phone Home Phone Mobile Phone   1Quinn AVALOS BERENICE Brother    [phone]           Primary language:  Sabine      needed? Yes   [place] [employer]:  [phone] op. 1  Other communication barriers:Language barrier    Preferred Method of Communication:     Current living arrangement: I live in a private home with family    Mobility Status/ Medical Equipment: Independent        Health Maintenance  Health Maintenance Reviewed: Up to date      My Access Plan  Medical Emergency 911   Primary Clinic Line [facility] [phone]   24 Hour Appointment Line [phone] or  [phone]) (toll-free)   24 Hour Nurse Line [phone] (toll-free)   Preferred Urgent Care [facility], [phone]     [facility]  [phone]     Preferred Pharmacy [facility] - [place], [place] - [address]     Behavioral Health Crisis Line The National Suicide Prevention Lifeline at 1-210.286.5022 or Text/Call 748           My Care Team Members  Patient Care Team         Relationship Specialty Notifications Start End    Jose Lewis MD PCP - General Family Medicine  10/19/23     Phone: [phone] Fax: [phone]         [address] [place] [address]    Jose Lewis MD Assigned PCP   8/9/23     Phone: [phone] Fax: [phone]         [address] [place] [address]    Rhiannon Martinez CHW Community Health Worker Primary Care - CC Admissions 10/13/23     Phone: [phone]         Dorota  MANUEL Ray Lead Care Coordinator  Admissions 10/19/23                 My Care Plans  Self Management and Treatment Plan    Care Plan  Care Plan: N648 Citizenship Medical Waiver       Problem: Citizenship       Goal: I will complete an N648 Citizenship Medical Waiver 90 days.       Start Date: 5/29/2024 Expected End Date: 8/29/2024    This Visit's Progress: 90% Recent Progress: 90%    Priority: High    Note:     Barriers:  Language barrier  Strengths: Agreeable to referral   Patient expressed understanding of goal: Yes     Action steps to achieve this goal:   1.  I will answer my phone when I am contacted by [employer] to schedule my evaluation.   2.  I will attend my appointment with [employer] as scheduled on  7/1/24 at 12pm with Dr. Mcelroy. COMPLETED   3.  I will attend my appointment with Mckayla PALat [employer] [address] [place] [address], for support with N400 application process as scheduled on Fri 7/5/24 at 11:30AM. COMPLETED, patient submitted all paperwork and was told to watch for follow up by mail. Patient completed biometric screening on 8/28/24.  4.  [facility] next month regarding this goal.   Note: Referral to [employer] sent on 4/30/24                              Action Plans on File:                       Advance Care Plans/Directives:   Advanced Care Plan/Directives on file:   No    Discussed with patient/caregiver(s): No data recorded           My Medical and Care Information  Problem List   Patient Active Problem List   Diagnosis    Problems related to education and literacy, unspecified      Current Medications and Allergies:  See printed Medication Report.    Care Coordination Start Date: 10/13/2023   Frequency of Care Coordination: monthly, more frequently as needed     Form Last Updated: 10/17/2024

## 2024-10-17 NOTE — PROGRESS NOTES
Care Coordination Clinician Chart Review    Situation: Patient chart reviewed by Care Coordinator.       Background: Care Coordination Program started: 10/13/2023. Initial assessment completed and patient-centered care plan(s) were developed with participation from patient. Lead CC handed patient off to CHW for continued outreaches.      SW moved reassessment after her interview for citizenship on 11/05/2024.     Assessment: Per chart review, patient outreach completed by CC CHW on 10/08/2024.  Patient is actively working to accomplish goal(s). Patient's goal(s) appropriate and relevant at this time. Patient is due for updated Plan of Care.  Assessments will be completed annually or as needed/with change of patient status.      Care Plan: N648 Citizenship Medical Waiver       Problem: Citizenship       Goal: I will complete an N648 Citizenship Medical Waiver 90 days.       Start Date: 5/29/2024 Expected End Date: 8/29/2024    This Visit's Progress: 90% Recent Progress: 90%    Priority: High    Note:     Barriers:  Language barrier  Strengths: Agreeable to referral   Patient expressed understanding of goal: Yes     Action steps to achieve this goal:   1.  I will answer my phone when I am contacted by [employer] to schedule my evaluation.   2.  I will attend my appointment with [employer] as scheduled on  7/1/24 at 12pm with Dr. Mcelroy. COMPLETED   3.  I will attend my appointment with Mckayla PALat [facility] [employer] [address] [place] [address], for support with N400 application process as scheduled on Fri 7/5/24 at 11:30AM. COMPLETED, patient submitted all paperwork and was told to watch for follow up by mail. Patient completed biometric screening on 8/28/24.  4.  I will follow up with CCC in the next month regarding this goal.   Note: Referral to [employer] sent on 4/30/24                                 Plan/Recommendations: The patient will continue working with Care  Coordination to achieve goal(s) as above. CHW will continue outreaches at minimum every 30 days and will involve Lead CC as needed or if patient is ready to move to Maintenance. Lead CC will continue to monitor CHW outreaches and patient's progress to goal(s) every 6 weeks.     Plan of Care updated and sent to patient: Yes, via mail    PAIGE Hernandez  Social Work Care Cooridinmichelle   [facility]   Phone: [phone]

## 2024-11-12 ENCOUNTER — PATIENT OUTREACH (OUTPATIENT)
Dept: CARE COORDINATION | Facility: CLINIC | Age: 29
End: 2024-11-12
Payer: COMMERCIAL

## 2024-11-12 ASSESSMENT — ACTIVITIES OF DAILY LIVING (ADL): DEPENDENT_IADLS:: INDEPENDENT

## 2024-11-12 NOTE — PROGRESS NOTES
Clinic Care Coordination Contact  Community Health Worker Follow Up  Spoke with patient - Rose  ID Almita Cabezas    Care Gaps:     Health Maintenance Due   Topic Date Due    HEPATITIS B IMMUNIZATION (1 of 3 - 19+ 3-dose series) Never done    DTAP/TDAP/TD IMMUNIZATION (1 - Tdap) Never done    INFLUENZA VACCINE (1) 09/01/2024    COVID-19 Vaccine (1 - 2024-25 season) Never done    YEARLY PREVENTIVE VISIT  12/07/2024     Scheduled 4/7 at 9:travis Lewis for preventive care visit      Care Plan:   Care Plan: N648 Citizenship Medical Waiver       Problem: Citizenship       Goal: I will complete an N648 Citizenship Medical Waiver 90 days.       Start Date: 5/29/2024 Expected End Date: 8/29/2024    This Visit's Progress: 90% Recent Progress: 90%    Priority: High    Note:     Barriers:  Language barrier  Strengths: Agreeable to referral   Patient expressed understanding of goal: Yes     Action steps to achieve this goal:   1.  I will answer my phone when I am contacted by [employer] to schedule my evaluation.   2.  I will attend my appointment with [employer] as scheduled on  7/1/24 at 12pm with Dr. Mcelroy. COMPLETED   3.  I will attend my appointment with Mckayla PALat [employer] [address] [place] [address], for support with N400 application process as scheduled on Fri 7/5/24 at 11:30AM. COMPLETED, patient submitted all paperwork and was told to watch for follow up by mail. Patient completed biometric screening on 8/28/24.  4.  I will follow up with [facility] in the next month regarding this goal.   Note: Referral to [employer] sent on 4/30/24                          Intervention and Education during outreach:   CHW inquired about update with citizenship process. Patient confirmed she complete interview with immigration office on 11/5. She was told she's approved and is to wait for letter notifying of date of appointment for citizenship in two weeks. Patient will  reach out to CCC team if any support is needed.     CHW Plan: 1 month follow up    Rhiannon [facility] Care Coordination  [facility]    Phone: [phone]

## 2024-11-26 ENCOUNTER — PATIENT OUTREACH (OUTPATIENT)
Dept: CARE COORDINATION | Facility: CLINIC | Age: 29
End: 2024-11-26
Payer: COMMERCIAL

## 2024-11-26 NOTE — PROGRESS NOTES
Care Coordination Clinician Chart Review    Situation: Patient chart reviewed by Care Coordinator.       Background: Care Coordination Program started: 10/13/2023. Initial assessment completed and patient-centered care plan(s) were developed with participation from patient. Lead CC handed patient off to CHW for continued outreaches.       Assessment: Per chart review, patient outreach completed by CC CHW on 11/12/2024.  Patient is actively working to accomplish goal(s). Patient's goal(s) appropriate and relevant at this time. Patient is not due for updated Plan of Care.  Assessments will be completed annually or as needed/with change of patient status.      Care Plan: N648 Citizenship Medical Waiver       Problem: Citizenship       Goal: I will complete an N648 Citizenship Medical Waiver 90 days.       Start Date: 5/29/2024 Expected End Date: 8/29/2024    This Visit's Progress: 90% Recent Progress: 90%    Priority: High    Note:     Barriers:  Language barrier  Strengths: Agreeable to referral   Patient expressed understanding of goal: Yes     Action steps to achieve this goal:   1.  I will answer my phone when I am contacted by [employer] to schedule my evaluation.   2.  I will attend my appointment with [employer] as scheduled on  7/1/24 at 12pm with Dr. Mcelroy. COMPLETED   3.  I will attend my appointment with Mckayla PALat [facility] [place] [employer] [place] [address], for support with N400 application process as scheduled on Fri 7/5/24 at 11:30AM. COMPLETED, patient submitted all paperwork and was told to watch for follow up by mail. Patient completed biometric screening on 8/28/24.  4.  I will follow up with CCC in the next month regarding this goal.   Note: Referral to [employer] sent on 4/30/24                                 Plan/Recommendations: The patient will continue working with Care Coordination to achieve goal(s) as above. CHW will continue outreaches at  minimum every 30 days and will involve Lead CC as needed or if patient is ready to move to Maintenance. Lead CC will continue to monitor CHW outreaches and patient's progress to goal(s) every 6 weeks.     Plan of Care updated and sent to patient: Lyla Raya [facility]  Social Work Care Cooridinator   [facility]   Phone: [phone]

## 2024-12-12 ENCOUNTER — PATIENT OUTREACH (OUTPATIENT)
Dept: CARE COORDINATION | Facility: CLINIC | Age: 29
End: 2024-12-12
Payer: COMMERCIAL

## 2024-12-12 ASSESSMENT — ACTIVITIES OF DAILY LIVING (ADL): DEPENDENT_IADLS:: INDEPENDENT

## 2024-12-12 NOTE — PROGRESS NOTES
Clinic Care Coordination Contact  Community Health Worker Follow Up  Spoke with patient - Sabine  ID [id number]    Care Gaps:     Health Maintenance Due   Topic Date Due    HEPATITIS B IMMUNIZATION (1 of 3 - 19+ 3-dose series) Never done    PAP  Never done    DTAP/TDAP/TD IMMUNIZATION (1 - Tdap) Never done    INFLUENZA VACCINE (1) 09/01/2024    COVID-19 Vaccine (1 - 2024-25 season) Never done    YEARLY PREVENTIVE VISIT  12/07/2024     Scheduled 4/7/14 at 9:40AM      Care Plan:   Care Plan: N648 Citizenship Medical Waiver       Problem: Citizenship       Goal: I will complete an N648 Citizenship Medical Waiver 90 days.       Start Date: 5/29/2024 Expected End Date: 8/29/2024    This Visit's Progress: 90% Recent Progress: 90%    Priority: High    Note:     Barriers:  Language barrier  Strengths: Agreeable to referral   Patient expressed understanding of goal: Yes     Action steps to achieve this goal:   1.  I will answer my phone when I am contacted by [employer] to schedule my evaluation.   2.  I will attend my appointment with [employer] as scheduled on  7/1/24 at 12pm with Dr. Mcelroy. COMPLETED   3.  I will attend my appointment with Mckayla PALat [employer] [address] [place] [address], for support with N400 application process as scheduled on Fri 7/5/24 at 11:30AM. COMPLETED, patient submitted all paperwork and was told to watch for follow up by mail. Patient completed biometric screening on 8/28/24.  4.  I will attend my appointment with Immigration Office as scheduled on 12/19/24.  5.  I will follow up with [facility] in the next month regarding this goal.   Note: Referral to [employer] sent on 4/30/24                          Intervention and Education during outreach:   CHW inquired about update on citizenship process. Patient shares that she has an appointment scheduled with Immigration Office on 12/19/24.     Patient inquired about support with checking  status of public housing application and shares that a Rosei speaking woman in the community assisted her with applying online on 11/15/24. Patient shared her user ID and password, CHW was able to log in and confirmed application is active and pending. Per patients agreement, CHW checked yes to have notifications sent to patients email.   CHW offered an appointment to review how to check status online. Patient declined because she cannot read English. Patient confirmed, she can reach out to the person who initially assisted for support as needed. CHW reviewed that she can also reach out to [facility] team/PCP for support if this person is unavailable. Patient expressed understanding. CHW did not create new goal at this time.     [email or web address]  User Name: [email or web address]  PW: [email or web address]    CHW Plan: 1 month follow up    Rhiannon [facility] Care Coordination  [facility]    Phone: [phone]

## 2024-12-16 ENCOUNTER — PATIENT OUTREACH (OUTPATIENT)
Dept: FAMILY MEDICINE | Facility: CLINIC | Age: 29
End: 2024-12-16
Payer: COMMERCIAL

## 2024-12-16 NOTE — Clinical Note
[facility] COORDINATION  ***  December 17, 2024    [facility]  [address]   [place] [address]      Dear Cedric,    I am a {clinic role :[id number]}

## 2024-12-16 NOTE — LETTER
[facility]  Patient Centered Plan of Care  About Me:        Patient Name:  Cedric Baldwin    YOB: 1995  Age:         29 year old   Yamil MRN:    [id number] Telephone Information:  Home Phone [phone]   Mobile [phone]       Address:  [address]  [place] [address] Email address:  [email or web address]      Emergency Contact(s)    Name Relationship Lgl Grd Work Phone Home Phone Mobile Phone   1Quinn AVALOS BERENICE Brother    [phone]           Primary language:  Sabine      needed? Yes   [facility] [employer]:  [phone] op. 1  Other communication barriers:No data recorded  Preferred Method of Communication:     Current living arrangement: I live in a private home with family    Mobility Status/ Medical Equipment: Independent        Health Maintenance  Health Maintenance Reviewed: No data recorded    My Access Plan  Medical Emergency 911   Primary Clinic Line [facility] [phone]   24 Hour Appointment Line [phone] or  [phone]) (toll-free)   24 Hour Nurse Line [phone] (toll-free)   Preferred Urgent Care [facility], [phone]     [facility]  [phone]     Preferred Pharmacy [facility] - [place], [place] - [address]     Behavioral Health Crisis Line The National Suicide Prevention Lifeline at 1-639.117.2616 or Text/Call 038           My Care Team Members  Patient Care Team         Relationship Specialty Notifications Start End    Jose Lewis MD PCP - General Family Medicine  10/19/23     Phone: [phone] Fax: [phone]         [address] [place] [address]    Jose Lewis MD Assigned PCP   8/9/23     Phone: [phone] Fax: [phone]         [address] [place] [address]    Rhiannon Martinez CHW Community Health Worker Primary Care - [facility] Admissions 10/13/23     Phone: [phone]         Dorota  MANUEL Ray Lead Care Coordinator  Admissions 10/19/23                 My Care Plans  Self Management and Treatment Plan    Care Plan  Care Plan: N648 Citizenship Medical Waiver       Problem: Citizenship       Goal: I will complete an N648 Citizenship Medical Waiver 90 days.       Start Date: 5/29/2024 Expected End Date: 8/29/2024    This Visit's Progress: 90% Recent Progress: 90%    Priority: High    Note:     Barriers:  Language barrier  Strengths: Agreeable to referral   Patient expressed understanding of goal: Yes     Action steps to achieve this goal:   1.  I will answer my phone when I am contacted by [employer] to schedule my evaluation.   2.  I will attend my appointment with [employer] as scheduled on  7/1/24 at 12pm with Dr. Mcelroy. COMPLETED   3.  I will attend my appointment with Mckayla [facility] [employer] [address] [place] [address], for support with N400 application process as scheduled on Fri 7/5/24 at 11:30AM. COMPLETED, patient submitted all paperwork and was told to watch for follow up by mail. Patient completed biometric screening on 8/28/24.  4.  I will attend my appointment with Immigration Office as scheduled on 12/19/24.  5.  I will follow up with [facility] in the next month regarding this goal.   Note: Referral to [employer] sent on 4/30/24                              Action Plans on File:                       Advance Care Plans/Directives:   Advanced Care Plan/Directives on file: No    Discussed with patient/caregiver(s): No data recorded           My Medical and Care Information  Problem List   Patient Active Problem List   Diagnosis    Problems related to education and literacy, unspecified      Current Medications:  Please refer to the most recent medication list provided to you by your medical team and reach out to your provider with any questions or to make any corrections.    Care Coordination Start Date: 10/13/2023   Frequency of Care  Coordination: monthly, more frequently as needed     Form Last Updated: 12/17/2024

## 2024-12-17 NOTE — PROGRESS NOTES
Clinic Care Coordination Contact    Assessment: Care Coordinator contacted patient for 2 month follow up.  Patient has continued to follow the plan of care and assessment is negative for any new needs or concerns.    Enrollment status: Graduated.      Plan: No further outreaches at this time.  Patient will continue to follow the plan of care.  If new needs arise a new Care Coordination referral may be placed.  FYI to PCP    Ayesha Raya [facility]  Social Work [facility]   Phone: [phone]